# Patient Record
Sex: FEMALE | Race: WHITE | NOT HISPANIC OR LATINO | Employment: OTHER | URBAN - METROPOLITAN AREA
[De-identification: names, ages, dates, MRNs, and addresses within clinical notes are randomized per-mention and may not be internally consistent; named-entity substitution may affect disease eponyms.]

---

## 2017-10-05 ENCOUNTER — TRANSFERRED RECORDS (OUTPATIENT)
Dept: HEALTH INFORMATION MANAGEMENT | Facility: CLINIC | Age: 77
End: 2017-10-05

## 2017-10-13 ENCOUNTER — ONCOLOGY VISIT (OUTPATIENT)
Dept: ONCOLOGY | Facility: CLINIC | Age: 77
End: 2017-10-13
Attending: INTERNAL MEDICINE
Payer: COMMERCIAL

## 2017-10-13 VITALS
HEART RATE: 72 BPM | BODY MASS INDEX: 18.82 KG/M2 | RESPIRATION RATE: 16 BRPM | DIASTOLIC BLOOD PRESSURE: 76 MMHG | OXYGEN SATURATION: 100 % | WEIGHT: 123.8 LBS | SYSTOLIC BLOOD PRESSURE: 130 MMHG

## 2017-10-13 DIAGNOSIS — D05.11 DUCTAL CARCINOMA IN SITU (DCIS) OF RIGHT BREAST: Primary | ICD-10-CM

## 2017-10-13 PROCEDURE — 99214 OFFICE O/P EST MOD 30 MIN: CPT | Performed by: INTERNAL MEDICINE

## 2017-10-13 PROCEDURE — 99211 OFF/OP EST MAY X REQ PHY/QHP: CPT

## 2017-10-13 ASSESSMENT — PAIN SCALES - GENERAL: PAINLEVEL: NO PAIN (0)

## 2017-10-13 NOTE — PROGRESS NOTES
"Oncology Rooming Note    October 13, 2017 12:25 PM   Laverne Christensen is a 77 year old female who presents for:    Chief Complaint   Patient presents with     Oncology Clinic Visit     Ductal carcinoma in situ (DCIS) of right breast     Initial Vitals: /81 (BP Location: Left arm, Patient Position: Sitting, Cuff Size: Adult Regular)  Pulse 72  Resp 16  Wt 56.2 kg (123 lb 12.8 oz)  SpO2 100%  BMI 18.82 kg/m2 Estimated body mass index is 18.82 kg/(m^2) as calculated from the following:    Height as of 3/15/16: 1.727 m (5' 8\").    Weight as of this encounter: 56.2 kg (123 lb 12.8 oz). Body surface area is 1.64 meters squared.  No Pain (0) Comment: Data Unavailable   No LMP recorded. Patient is postmenopausal.  Allergies reviewed: Yes  Medications reviewed: Yes    Medications: Medication refills not needed today.  Pharmacy name entered into [facility]:    [facility] [address] [place], [place] [address], [place] [facility] [address] [facility] [address] [facility] [address]    Clinical concerns: None                4 minutes for nursing intake (face to face time)     Michelle Rangel MA    DISCHARGE PLAN:  Next appointments: See patient instruction section.  Brought the patient to the [facility] for scheduling.  Departure Mode: Ambulatory  Accompanied by: self  4 minutes for nursing discharge (face to face time)   Michelle Rangel MA    "

## 2017-10-13 NOTE — MR AVS SNAPSHOT
"              After Visit Summary   10/13/2017    Laverne Christensen    MRN: [id number]           Patient Information     Date Of Birth          1940        Visit Information        Provider Department      10/13/2017 12:30 PM Heather Vincent MD [facility] [place], operated [facility]        Today's Diagnoses     Ductal carcinoma in situ (DCIS) of right breast    -  1      Care Instructions    -mammogram in 1 year at [facility]  -return to clinic in 1 year          Follow-ups after your visit        Who to contact     If you have questions or need follow up information about today's clinic visit or your schedule please contact [facility] directly at [phone].  Normal or non-critical lab and imaging results will be communicated to you by [employer], letter or phone within 4 business days after the clinic has received the results. If you do not hear from us within 7 days, please contact the clinic through [employer] or phone. If you have a critical or abnormal lab result, we will notify you by phone as soon as possible.  Submit refill requests through [employer] or call your pharmacy and they will forward the refill request to us. Please allow 3 business days for your refill to be completed.          Additional Information About Your Visit        MyChart Information     [employer] lets you send messages to your doctor, view your test results, renew your prescriptions, schedule appointments and more. To sign up, go to [email or web address] . Click on \"Log in\" on the left side of the screen, which will take you to the Welcome page. Then click on \"Sign up Now\" on the right side of the page.     You will be asked to enter the access code listed below, as well as some personal information. Please follow the directions to create your username and password.     Your access code is: [email or web address]  Expires: 2018  1:04 PM     Your access code will  in 90 days. If you need help or a new code, please call " your [place] clinic or [phone].        Care EveryWhere ID     This is your Care EveryWhere ID. This could be used by other organizations to access your [place] medical records  [id number]        Your Vitals Were     Pulse Respirations Pulse Oximetry BMI (Body Mass Index)          72 16 100% 18.82 kg/m2         Blood Pressure from Last 3 Encounters:   10/13/17 130/76   11/15/16 114/65   03/30/16 98/70    Weight from Last 3 Encounters:   10/13/17 56.2 kg (123 lb 12.8 oz)   11/15/16 51.9 kg (114 lb 6.4 oz)   03/16/16 55.8 kg (123 lb)              Today, you had the following     No orders found for display       Primary Care Provider Office Phone # Fax #    Joel Jimenez [phone]       [facility] [address] [place] [address]        Equal Access to Services     [facility]: Hadii aad ku hadasho Soomaali, waaxda luqadaha, qaybta kaalmada adeegyada, waxay idiin haynickn alden nichols . So [facility] [phone].    ATENCIÓN: Si habla español, tiene a renteria disposición servicios gratuitos de asistencia lingüística. Llame al [phone].    We comply with applicable federal civil rights laws and [place] laws. We do not discriminate on the basis of race, color, national origin, age, disability, sex, sexual orientation, or gender identity.            Thank you!     Thank you for choosing [facility]  for your care. Our goal is always to provide you with excellent care. Hearing back from our patients is one way we can continue to improve our services. Please take a few minutes to complete the written survey that you may receive in the mail after your visit with us. Thank you!             Your Updated Medication List - Protect others around you: Learn how to safely use, store and throw away your medicines at www.disposemymeds.org.          This list is accurate as of: 10/13/17  1:04 PM.  Always use your most recent med list.                   Brand Name  Dispense Instructions for use Diagnosis    ASPIRIN PO      Take by mouth daily        calcium carb 1250 mg (500 mg [place])/vitamin D 200 units 500-200 MG-UNIT per tablet    OSCAL with D     Take 1 tablet by mouth 2 times daily (with meals)        COQ10 PO      Take  by mouth daily.        FOSAMAX PO      Take 70 mg by mouth once a week        levothyroxine 75 MCG tablet    SYNTHROID/LEVOTHROID     1 TABLET DAILY        MULTIPLE VITAMIN PO      None Entered        NEW MED      daily Eye vitamin pill        VITAMIN C PO      None Entered        ZINC PO      None Entered

## 2017-10-13 NOTE — PATIENT INSTRUCTIONS
-mammogram in 1 year at [facility] - patient requested appointment scheduled for [place] location - Scheduled - Kandy  -return to clinic in 1 year  - Scheduled Kandy    AVS given to patient - Kandy

## 2017-10-13 NOTE — PROGRESS NOTES
[facility]    Hematology/Oncology Established Patient Follow-up Note      Today's Date: 10/13/2017    Reason for Follow-up: bilateral breast cancer    HISTORY OF PRESENT ILLNESS: Laverne Christensen is a 77 year old female who initially saw Dr. Lima for breast cancer.  She had a left lumpectomy on 03/08/1995 demonstrating a grade 1 lesion with 7 negative lymph nodes. She received Cytoxan, methotrexate and 5-FU and then tamoxifen until 12/2001 when the tamoxifen was discontinued. She also had received radiation therapy to the left breast, completing it on 05/23/1995.   She was found to have a suspicious lesion in the right breast in 2011 on a routine mammogram. Biopsy showed ductal carcinoma in situ. The lesion measured 2 cm and was a grade 1 lesion without evidence of invasive disease. She went on to receive radiation therapy at [facility], receiving 4860 cGy in 33 fractions from 10/03/2011 through 11/16/2011. The tumor was triple negative. She is therefore being followed without hormonal therapy. She has never had recurrence of either breast cancer.      She saw Dr. Lima in 09/12/2013 she had undergone a routine CT scan of the chest showing a bilobed ground-glass nodular opacity in the right lung which was slightly more nodular and more prominent. She has a 30-pack-year smoking history. She was referred to Dr. Rupert Bryant, who referred her to Dr. Ravinder Figueroa. Dr. Figueroa performed a biopsy on the lesion in 10/2014. It was benign, and therefore follow-up chest CT's have been done.      On CT chest in April 2015, there was a new 2 cm pleural-based lung nodule in the  right lower lobe.  She underwent wedge biopsy on 5/12/15, and it was benign.      INTERIM HISTORY:  Jazmin comes in for her regular follow-up for her history of breast cancer.  She says that she has neuropathy in her feet, and she saw neurology.  She thinks it is related to her smoking.  She denies new breast lumps/bumps.   Her  passed away last year.        REVIEW OF SYSTEMS:   14 point ROS was reviewed and is negative other than as noted above in HPI.        HOME MEDICATIONS:  Current Outpatient Prescriptions   Medication Sig Dispense Refill     ASPIRIN PO Take by mouth daily       Alendronate Sodium (FOSAMAX PO) Take 70 mg by mouth once a week       calcium carb 1250 mg, 500 mg [place],/vitamin D 200 units (OSCAL WITH D) 500-200 MG-UNIT per tablet Take 1 tablet by mouth 2 times daily (with meals)       NEW MED daily Eye vitamin pill       Coenzyme Q10 (COQ10 PO) Take  by mouth daily.       LEVOTHYROXINE SODIUM 75 MCG PO TABS 1 TABLET DAILY       MULTIPLE VITAMIN PO None Entered       VITAMIN C PO None Entered       ZINC PO None Entered           ALLERGIES:  No Known Allergies      PAST MEDICAL HISTORY:  Past Medical History:   Diagnosis Date     Arthritis     DJD     COPD (chronic obstructive pulmonary disease) (H)      Osteopenia      Thyroid disease     HYPOTHYROIDISM         PAST SURGICAL HISTORY:  Past Surgical History:   Procedure Laterality Date     ABDOMEN SURGERY      SM INTESTINE SURGERY     APPENDECTOMY       BREAST SURGERY      BREAST LUMPECTOMY     ENT SURGERY      TONSILLECTOMY     HEAD & NECK SURGERY      THYROIDECTOMY     PHACOEMULSIFICATION CLEAR CORNEA WITH STANDARD INTRAOCULAR LENS IMPLANT Left 3/16/2016    Procedure: PHACOEMULSIFICATION CLEAR CORNEA WITH STANDARD INTRAOCULAR LENS IMPLANT;  Surgeon: Darius Finch MD;  Location: [facility]     PHACOEMULSIFICATION CLEAR CORNEA WITH STANDARD INTRAOCULAR LENS IMPLANT Right 3/30/2016    Procedure: PHACOEMULSIFICATION CLEAR CORNEA WITH STANDARD INTRAOCULAR LENS IMPLANT;  Surgeon: Darius Finch MD;  Location: [facility]     THORACIC SURGERY      LUNG NODULE REMOVAL     WEDGE RESECTION EYELID Left 12/4/2015    Procedure: WEDGE RESECTION EYELID;  Surgeon: Jone Acharya MD;  Location: [facility]         SOCIAL HISTORY:  Social History     Social History     Marital status:       Spouse name: N/A     Number of children: N/A     Years of education: N/A     Occupational History     Not on file.     Social History Main Topics     Smoking status: Current Every Day Smoker     Smokeless tobacco: Not on file     Alcohol use Yes      Comment: daily cocktail     Drug use: No     Sexual activity: Not on file     Other Topics Concern     Not on file     Social History Narrative         FAMILY HISTORY:  No family history on file.      PHYSICAL EXAM:  Vital signs:  /76 (BP Location: Left arm, Patient Position: Sitting, Cuff Size: Adult Regular)  Pulse 72  Resp 16  Wt 56.2 kg (123 lb 12.8 oz)  SpO2 100%  BMI 18.82 kg/m2   ECO  GENERAL/CONSTITUTIONAL: No acute distress.  EYES: No scleral icterus.  LYMPH: No anterior cervical, posterior cervical, supraclavicular, or axillary adenopathy.   RESPIRATORY:  Clear to ausculation bilaterally.  CARDIOVASCULAR: Regular rate and rhythm without murmurs, gallops, or rubs.  GASTROINTESTINAL: No tenderness. The patient has normal bowel sounds. No guarding.  No distention.  BREAST: Right-no palpable mass, discharge, rash, or axillary lymphadenopathy.  Left-no palpable mass, discharge, rash, or axillary lymphadenopathy.    MUSCULOSKELETAL: Warm and well-perfused, no cyanosis, clubbing, or edema.  NEUROLOGIC: Alert, oriented, answers questions appropriately.  INTEGUMENTARY: No jaundice.  Scattered moles.      LABS:  None today.      IMAGING:  Bilateral mammogram 10/05/17:  BI-RADS 2: benign     CT chest 10/9/17:  1.8 mm spiculated right apical nodule series 3 image 19 is subtly more prominent compared to prior exam; however, when viewed on coronal images, it appears stable favoring this perceived increase is due to difference in technique and slice selection. Recommend 6 month follow-up or as otherwise recommended by pulmonary service.    2. Decreased size of a lobulated left basilar opacity suggesting evolving scarring and decreased loculated  effusion.    3. Otherwise stable CT of the chest.      ASSESSMENT/PLAN:  Laverne Christensen is a 77 year old female:     1) Left sided breast cancer in 1995, and right sided DCIS in 2011: Now on surveillance.  Most recent bilateral mammogram done in 10/05/17 was reviewed, and is benign.  -continue yearly mammograms, next one due in October 2018  -RTC in 1 year    2) Pulmonary nodules: she does have a heavy smoking history.  There was a 2 cm pleural-based lung nodule in the right lower lobe that was biopsied and benign.  Other pulmonary nodules also being followed.  -patient follows with Dr. Figueroa  -she had a CT chest done in October 2017 at [facility], which was stable.  She has follow-up with Dr. Figueroa next week.    3) Smoking cessation: She continues to smoke half a pack a day.  She is trying to cut down and has tried medications and patches, but has been unsuccessful.  She tried nicotine patch, but got nightmares from it.  She is considering Chantix.  -continue to advise smoking cessation    She declined flu shot today.      I spent a total of 25 minutes with the patient, with over >50% of the time in counseling and/or coordination of care.       Heather Vincent MD  Hematology/Oncology  [facility]

## 2017-10-13 NOTE — LETTER
"    10/13/2017        RE: Laverne Christensen  [address]  [place] [address]        Oncology Rooming Note    October 13, 2017 12:25 PM   Laverne Christensen is a 77 year old female who presents for:    Chief Complaint   Patient presents with     Oncology Clinic Visit     Ductal carcinoma in situ (DCIS) of right breast     Initial Vitals: /81 (BP Location: Left arm, Patient Position: Sitting, Cuff Size: Adult Regular)  Pulse 72  Resp 16  Wt 56.2 kg (123 lb 12.8 oz)  SpO2 100%  BMI 18.82 kg/m2 Estimated body mass index is 18.82 kg/(m^2) as calculated from the following:    Height as of 3/15/16: 1.727 m (5' 8\").    Weight as of this encounter: 56.2 kg (123 lb 12.8 oz). Body surface area is 1.64 meters squared.  No Pain (0) Comment: Data Unavailable   No LMP recorded. Patient is postmenopausal.  Allergies reviewed: Yes  Medications reviewed: Yes    Medications: Medication refills not needed today.  Pharmacy name entered into [email or web address]:    [id number] - [place], [place] - [address] AT [facility] [place] [address] [facility]ARMACY [facility] [place] - [address]    Clinical concerns: None                4 minutes for nursing intake (face to face time)     Michelle Rangel MA              [facility]    Hematology/Oncology Established Patient Follow-up Note      Today's Date: 10/13/2017    Reason for Follow-up: bilateral breast cancer    HISTORY OF PRESENT ILLNESS: Laverne Christensen is a 77 year old female who initially saw Dr. Lima for breast cancer.  She had a left lumpectomy on 03/08/1995 demonstrating a grade 1 lesion with 7 negative lymph nodes. She received Cytoxan, methotrexate and 5-FU and then tamoxifen until 12/2001 when the tamoxifen was discontinued. She also had received radiation therapy to the left breast, completing it on 05/23/1995.   She was found to have a suspicious lesion in the right breast in 2011 on a routine mammogram. Biopsy " showed ductal carcinoma in situ. The lesion measured 2 cm and was a grade 1 lesion without evidence of invasive disease. She went on to receive radiation therapy at [facility], receiving 4860 cGy in 33 fractions from 10/03/2011 through 11/16/2011. The tumor was triple negative. She is therefore being followed without hormonal therapy. She has never had recurrence of either breast cancer.      She saw Dr. Lima in 09/12/2013 she had undergone a routine CT scan of the chest showing a bilobed ground-glass nodular opacity in the right lung which was slightly more nodular and more prominent. She has a 30-pack-year smoking history. She was referred to Dr. Rupert Bryant, who referred her to Dr. Ravinder Figueroa. Dr. Figueroa performed a biopsy on the lesion in 10/2014. It was benign, and therefore follow-up chest CT's have been done.      On CT chest in April 2015, there was a new 2 cm pleural-based lung nodule in the  right lower lobe.  She underwent wedge biopsy on 5/12/15, and it was benign.      INTERIM HISTORY:  Jazmin comes in for her regular follow-up for her history of breast cancer.  She says that she has neuropathy in her feet, and she saw neurology.  She thinks it is related to her smoking.  She denies new breast lumps/bumps.  Her  passed away last year.        REVIEW OF SYSTEMS:   14 point ROS was reviewed and is negative other than as noted above in HPI.        HOME MEDICATIONS:  Current Outpatient Prescriptions   Medication Sig Dispense Refill     ASPIRIN PO Take by mouth daily       Alendronate Sodium (FOSAMAX PO) Take 70 mg by mouth once a week       calcium carb 1250 mg, 500 mg [place],/vitamin D 200 units (OSCAL WITH D) 500-200 MG-UNIT per tablet Take 1 tablet by mouth 2 times daily (with meals)       NEW MED daily Eye vitamin pill       Coenzyme Q10 (COQ10 PO) Take  by mouth daily.       LEVOTHYROXINE SODIUM 75 MCG PO TABS 1 TABLET DAILY       MULTIPLE VITAMIN PO None Entered       VITAMIN C PO None  Entered       ZINC PO None Entered           ALLERGIES:  No Known Allergies      PAST MEDICAL HISTORY:  Past Medical History:   Diagnosis Date     Arthritis     DJD     COPD (chronic obstructive pulmonary disease) (H)      Osteopenia      Thyroid disease     HYPOTHYROIDISM         PAST SURGICAL HISTORY:  Past Surgical History:   Procedure Laterality Date     ABDOMEN SURGERY      SM INTESTINE SURGERY     APPENDECTOMY       BREAST SURGERY      BREAST LUMPECTOMY     ENT SURGERY      TONSILLECTOMY     HEAD & NECK SURGERY      THYROIDECTOMY     PHACOEMULSIFICATION CLEAR CORNEA WITH STANDARD INTRAOCULAR LENS IMPLANT Left 3/16/2016    Procedure: PHACOEMULSIFICATION CLEAR CORNEA WITH STANDARD INTRAOCULAR LENS IMPLANT;  Surgeon: Darius Finch MD;  Location: [facility]     PHACOEMULSIFICATION CLEAR CORNEA WITH STANDARD INTRAOCULAR LENS IMPLANT Right 3/30/2016    Procedure: PHACOEMULSIFICATION CLEAR CORNEA WITH STANDARD INTRAOCULAR LENS IMPLANT;  Surgeon: Darius Finch MD;  Location: [facility]     THORACIC SURGERY      LUNG NODULE REMOVAL     WEDGE RESECTION EYELID Left 2015    Procedure: WEDGE RESECTION EYELID;  Surgeon: Jone Acharya MD;  Location: [facility]         SOCIAL HISTORY:  Social History     Social History     Marital status:      Spouse name: N/A     Number of children: N/A     Years of education: N/A     Occupational History     Not on file.     Social History Main Topics     Smoking status: Current Every Day Smoker     Smokeless tobacco: Not on file     Alcohol use Yes      Comment: daily cocktail     Drug use: No     Sexual activity: Not on file     Other Topics Concern     Not on file     Social History Narrative         FAMILY HISTORY:  No family history on file.      PHYSICAL EXAM:  Vital signs:  /76 (BP Location: Left arm, Patient Position: Sitting, Cuff Size: Adult Regular)  Pulse 72  Resp 16  Wt 56.2 kg (123 lb 12.8 oz)  SpO2 100%  BMI 18.82 kg/m2   ECO  GENERAL/CONSTITUTIONAL:  No acute distress.  EYES: No scleral icterus.  LYMPH: No anterior cervical, posterior cervical, supraclavicular, or axillary adenopathy.   RESPIRATORY:  Clear to ausculation bilaterally.  CARDIOVASCULAR: Regular rate and rhythm without murmurs, gallops, or rubs.  GASTROINTESTINAL: No tenderness. The patient has normal bowel sounds. No guarding.  No distention.  BREAST: Right-no palpable mass, discharge, rash, or axillary lymphadenopathy.  Left-no palpable mass, discharge, rash, or axillary lymphadenopathy.    MUSCULOSKELETAL: Warm and well-perfused, no cyanosis, clubbing, or edema.  NEUROLOGIC: Alert, oriented, answers questions appropriately.  INTEGUMENTARY: No jaundice.  Scattered moles.      LABS:  None today.      IMAGING:  Bilateral mammogram 10/05/17:  BI-RADS 2: benign     CT chest 10/9/17:  1.8 mm spiculated right apical nodule series 3 image 19 is subtly more prominent compared to prior exam; however, when viewed on coronal images, it appears stable favoring this perceived increase is due to difference in technique and slice selection. Recommend 6 month follow-up or as otherwise recommended by pulmonary service.    2. Decreased size of a lobulated left basilar opacity suggesting evolving scarring and decreased loculated effusion.    3. Otherwise stable CT of the chest.      ASSESSMENT/PLAN:  Laverne Christensen is a 77 year old female:     1) Left sided breast cancer in 1995, and right sided DCIS in 2011: Now on surveillance.  Most recent bilateral mammogram done in 10/05/17 was reviewed, and is benign.  -continue yearly mammograms, next one due in October 2018  -RTC in 1 year    2) Pulmonary nodules: she does have a heavy smoking history.  There was a 2 cm pleural-based lung nodule in the right lower lobe that was biopsied and benign.  Other pulmonary nodules also being followed.  -patient follows with Dr. Figueroa  -she had a CT chest done in October 2017 at [facility], which was stable.  She has follow-up  with Dr. Figueroa next week.    3) Smoking cessation: She continues to smoke half a pack a day.  She is trying to cut down and has tried medications and patches, but has been unsuccessful.  She tried nicotine patch, but got nightmares from it.  She is considering Chantix.  -continue to advise smoking cessation      I spent a total of 25 minutes with the patient, with over >50% of the time in counseling and/or coordination of care.       Heather Vincent MD  Hematology/Oncology  [facility]            Sincerely,        Heather Vincent MD

## 2018-10-08 ENCOUNTER — TRANSFERRED RECORDS (OUTPATIENT)
Dept: HEALTH INFORMATION MANAGEMENT | Facility: CLINIC | Age: 78
End: 2018-10-08

## 2018-10-12 ENCOUNTER — ONCOLOGY VISIT (OUTPATIENT)
Dept: ONCOLOGY | Facility: CLINIC | Age: 78
End: 2018-10-12
Attending: INTERNAL MEDICINE
Payer: COMMERCIAL

## 2018-10-12 VITALS
SYSTOLIC BLOOD PRESSURE: 135 MMHG | TEMPERATURE: 98.3 F | BODY MASS INDEX: 18.76 KG/M2 | WEIGHT: 123.4 LBS | OXYGEN SATURATION: 97 % | RESPIRATION RATE: 16 BRPM | DIASTOLIC BLOOD PRESSURE: 77 MMHG | HEART RATE: 72 BPM

## 2018-10-12 DIAGNOSIS — Z12.31 VISIT FOR SCREENING MAMMOGRAM: ICD-10-CM

## 2018-10-12 DIAGNOSIS — D05.11 DUCTAL CARCINOMA IN SITU (DCIS) OF RIGHT BREAST: Primary | ICD-10-CM

## 2018-10-12 PROCEDURE — 99214 OFFICE O/P EST MOD 30 MIN: CPT | Performed by: INTERNAL MEDICINE

## 2018-10-12 PROCEDURE — G0463 HOSPITAL OUTPT CLINIC VISIT: HCPCS

## 2018-10-12 RX ORDER — OXYBUTYNIN CHLORIDE 5 MG/1
5 TABLET ORAL
COMMUNITY
Start: 2018-01-18 | End: 2019-01-18

## 2018-10-12 ASSESSMENT — PAIN SCALES - GENERAL: PAINLEVEL: MILD PAIN (2)

## 2018-10-12 NOTE — PATIENT INSTRUCTIONS
-schedule mammogram in October 2019 - she get them done with [facility] Patient will scheduled her mammogram/ Karol   -return to clinic in 1 year  Scheduled 10/11/19  1:00pm/ Karol      Patient declined AVS, made note of appointment/ Karol

## 2018-10-12 NOTE — MR AVS SNAPSHOT
After Visit Summary   10/12/2018    Laverne Christensen    MRN: [id number]           Patient Information     Date Of Birth          1940        Visit Information        Provider Department      10/12/2018 1:30 PM Heather Vincent MD [facility]        Today's Diagnoses     Ductal carcinoma in situ (DCIS) of right breast    -  1    Visit for screening mammogram          Care Instructions    -schedule mammogram in October 2019 - she get them done with [facility] Patient will scheduled her mammogram/ Karol   -return to clinic in 1 year  Scheduled 10/11/19  1:00pm/ Karol      Patient declined AVS, made note of appointment/ Karol           Follow-ups after your visit        Your next 10 appointments already scheduled     Oct 11, 2019  1:00 PM CDT   Return Visit with Heather Vincent MD   [facility] ([facility])    [facility] [place]  [address]  [place] [address]   [phone]              Future tests that were ordered for you today     Open Future Orders        Priority Expected Expires Ordered    MA Screen Bilateral w/Severo Routine 10/9/2019 10/12/2019 10/12/2018            Who to contact     If you have questions or need follow up information about today's clinic visit or your schedule please contact [facility] directly at [phone].  Normal or non-critical lab and imaging results will be communicated to you by MyChart, letter or phone within 4 business days after the clinic has received the results. If you do not hear from us within 7 days, please contact the clinic through MyChart or phone. If you have a critical or abnormal lab result, we will notify you by phone as soon as possible.  Submit refill requests through [employer] or call your pharmacy and they will forward the refill request to us. Please allow 3 business days for your refill to be completed.          Additional Information About  Your Visit        Care EveryWhere ID     This is your Care EveryWhere ID. This could be used by other organizations to access your [facility] medical records  [id number]        Your Vitals Were     Pulse Temperature Respirations Pulse Oximetry BMI (Body Mass Index)       72 98.3  F (36.8  C) (Oral) 16 97% 18.76 kg/m2        Blood Pressure from Last 3 Encounters:   10/12/18 135/77   10/13/17 130/76   11/15/16 114/65    Weight from Last 3 Encounters:   10/12/18 56 kg (123 lb 6.4 oz)   10/13/17 56.2 kg (123 lb 12.8 oz)   11/15/16 51.9 kg (114 lb 6.4 oz)               Primary Care Provider Office Phone # Fax #    Joel Jimenez [phone]       [facility] [address] [place] [address]        Equal Access to Services     [facility] : Hadii aad ku hadasho Soomaali, waaxda luqadaha, qaybta kaalmada adeegyada, joel nichols . So [facility] [place] [facility] [phone].    ATENCIÓN: Si lylela español, tiene a renteria disposición servicios gratuitos de asistencia lingüística. Asifame al [phone].    We comply with applicable federal civil rights laws and [place] laws. We do not discriminate on the basis of race, color, national origin, age, disability, sex, sexual orientation, or gender identity.            Thank you!     Thank you for choosing [facility]  for your care. Our goal is always to provide you with excellent care. Hearing back from our patients is one way we can continue to improve our services. Please take a few minutes to complete the written survey that you may receive in the mail after your visit with us. Thank you!             Your Updated Medication List - Protect others around you: Learn how to safely use, store and throw away your medicines at www.disposemymeds.org.          This list is accurate as of 10/12/18  5:24 PM.  Always use your most recent med list.                   Brand Name Dispense Instructions for use Diagnosis    ASPIRIN PO       Take by mouth daily        calcium carbonate 500 mg-vitamin D 200 units 500-200 MG-UNIT per tablet    OSCAL with D;OYSTER SHELL CALCIUM     Take 1 tablet by mouth 2 times daily (with meals)        COQ10 PO      Take  by mouth daily.        FOSAMAX PO      Take 70 mg by mouth once a week        levothyroxine 75 MCG tablet    SYNTHROID/LEVOTHROID     1 TABLET DAILY        MULTIPLE VITAMIN PO      None Entered        NEW MED      daily Eye vitamin pill        oxybutynin 5 MG tablet    DITROPAN     Take 5 mg by mouth        VITAMIN C PO      None Entered        ZINC PO      None Entered

## 2018-10-12 NOTE — PROGRESS NOTES
[facility]    Hematology/Oncology Established Patient Follow-up Note      Today's Date: 10/12/2018    Reason for Follow-up: bilateral breast cancer    HISTORY OF PRESENT ILLNESS: Laverne Christensen is a 78 year old female who initially saw Dr. Lima for breast cancer.  She had a left lumpectomy on 03/08/1995 demonstrating a grade 1 lesion with 7 negative lymph nodes. She received Cytoxan, methotrexate and 5-FU and then tamoxifen until 12/2001 when the tamoxifen was discontinued. She also had received radiation therapy to the left breast, completing it on 05/23/1995.   She was found to have a suspicious lesion in the right breast in 2011 on a routine mammogram. Biopsy showed ductal carcinoma in situ. The lesion measured 2 cm and was a grade 1 lesion without evidence of invasive disease. She went on to receive radiation therapy at [facility], receiving 4860 cGy in 33 fractions from 10/03/2011 through 11/16/2011. The tumor was triple negative. She is therefore being followed without hormonal therapy. She has never had recurrence of either breast cancer.      She saw Dr. Lima in 09/12/2013 she had undergone a routine CT scan of the chest showing a bilobed ground-glass nodular opacity in the right lung which was slightly more nodular and more prominent. She has a 30-pack-year smoking history. She was referred to Dr. Rupert Bryant, who referred her to Dr. Ravinder Figueroa. Dr. Figueroa performed a biopsy on the lesion in 10/2014. It was benign, and therefore follow-up chest CT's have been done.      On CT chest in April 2015, there was a new 2 cm pleural-based lung nodule in the right lower lobe.  She underwent wedge biopsy on 5/12/15, and it was benign.      INTERIM HISTORY:  Jazmin comes in for her regular follow-up for her history of breast cancer.  She says that she is feeling fine.  She denies new symptoms.  She denies new breast lumps/bumps or new pains.  She still has no motivation to quit  smoking.        REVIEW OF SYSTEMS:   14 point ROS was reviewed and is negative other than as noted above in HPI.        HOME MEDICATIONS:  Current Outpatient Prescriptions   Medication Sig Dispense Refill     Alendronate Sodium (FOSAMAX PO) Take 70 mg by mouth once a week       calcium carb 1250 mg, 500 mg [place],/vitamin D 200 units (OSCAL WITH D) 500-200 MG-UNIT per tablet Take 1 tablet by mouth 2 times daily (with meals)       Coenzyme Q10 (COQ10 PO) Take  by mouth daily.       LEVOTHYROXINE SODIUM 75 MCG PO TABS 1 TABLET DAILY       MULTIPLE VITAMIN PO None Entered       NEW MED daily Eye vitamin pill       oxybutynin (DITROPAN) 5 MG tablet Take 5 mg by mouth       VITAMIN C PO None Entered       ZINC PO None Entered       ASPIRIN PO Take by mouth daily           ALLERGIES:  No Known Allergies      PAST MEDICAL HISTORY:  Past Medical History:   Diagnosis Date     Arthritis     DJD     COPD (chronic obstructive pulmonary disease) (H)      Osteopenia      Thyroid disease     HYPOTHYROIDISM         PAST SURGICAL HISTORY:  Past Surgical History:   Procedure Laterality Date     ABDOMEN SURGERY      SM INTESTINE SURGERY     APPENDECTOMY       BREAST SURGERY      BREAST LUMPECTOMY     ENT SURGERY      TONSILLECTOMY     HEAD & NECK SURGERY      THYROIDECTOMY     PHACOEMULSIFICATION CLEAR CORNEA WITH STANDARD INTRAOCULAR LENS IMPLANT Left 3/16/2016    Procedure: PHACOEMULSIFICATION CLEAR CORNEA WITH STANDARD INTRAOCULAR LENS IMPLANT;  Surgeon: Darius Finch MD;  Location: [facility]     PHACOEMULSIFICATION CLEAR CORNEA WITH STANDARD INTRAOCULAR LENS IMPLANT Right 3/30/2016    Procedure: PHACOEMULSIFICATION CLEAR CORNEA WITH STANDARD INTRAOCULAR LENS IMPLANT;  Surgeon: Darius Finch MD;  Location: [facility]     THORACIC SURGERY      LUNG NODULE REMOVAL     WEDGE RESECTION EYELID Left 12/4/2015    Procedure: WEDGE RESECTION EYELID;  Surgeon: Jone Acharya MD;  Location: [facility]         SOCIAL HISTORY:  Social History      Social History     Marital status:      Spouse name: N/A     Number of children: N/A     Years of education: N/A     Occupational History     Not on file.     Social History Main Topics     Smoking status: Current Every Day Smoker     Smokeless tobacco: Not on file     Alcohol use Yes      Comment: daily cocktail     Drug use: No     Sexual activity: Not on file     Other Topics Concern     Not on file     Social History Narrative         FAMILY HISTORY:  No family history on file.      PHYSICAL EXAM:  Vital signs:  /77 (BP Location: Left arm, Patient Position: Chair, Cuff Size: Adult Regular)  Pulse 72  Temp 98.3  F (36.8  C) (Oral)  Resp 16  Wt 56 kg (123 lb 6.4 oz)  SpO2 97%  BMI 18.76 kg/m2   ECO  GENERAL/CONSTITUTIONAL: No acute distress.  EYES: No scleral icterus.  LYMPH: No anterior cervical, posterior cervical, supraclavicular, or axillary adenopathy.   RESPIRATORY:  Clear to ausculation bilaterally.  CARDIOVASCULAR: Regular rate and rhythm without murmurs, gallops, or rubs.  GASTROINTESTINAL: No tenderness. The patient has normal bowel sounds. No guarding.  No distention.  BREAST: Right-no palpable mass, discharge, rash, or axillary lymphadenopathy.  Left-no palpable mass, discharge, rash, or axillary lymphadenopathy.    MUSCULOSKELETAL: Warm and well-perfused, no cyanosis, clubbing, or edema.  NEUROLOGIC: Alert, oriented, answers questions appropriately.  INTEGUMENTARY: No jaundice.  Scattered moles.      LABS:  None today.      IMAGING:  Bilateral mammogram 10/08/18:  BI-RADS 2: benign     CT chest 10/9/17:  1.8 mm spiculated right apical nodule series 3 image 19 is subtly more prominent compared to prior exam; however, when viewed on coronal images, it appears stable favoring this perceived increase is due to difference in technique and slice selection. Recommend 6 month follow-up or as otherwise recommended by pulmonary service.    2. Decreased size of a lobulated left  basilar opacity suggesting evolving scarring and decreased loculated effusion.    3. Otherwise stable CT of the chest.      ASSESSMENT/PLAN:  Laverne Christensen is a 78 year old female:     1) Left sided breast cancer in 1995, and right sided DCIS in 2011: Now on surveillance.  Most recent bilateral mammogram done in 10/08/18 was reviewed, and is benign.  -continue yearly mammograms, next one due in October 2019 - ordered.  She has them done at [facility].  -RTC in 1 year    2) Pulmonary nodules: she does have a heavy smoking history.  There was a 2 cm pleural-based lung nodule in the right lower lobe that was biopsied and benign.  Other pulmonary nodules also being followed.  -patient follows with Dr. Figueroa  -she has follow-up appointment with Dr. Figueroa and follow-up CT scan coming up next month.  She doesn't seem to remember this, and I asked her to call Dr. Figueroa's office to confirm.      3) Smoking cessation: She continues to smoke half a pack a day.  She is trying to cut down and has tried medications and patches, but has been unsuccessful.  She says that she still has no motivation to quit.    4) Basal cell carcinoma: on her face.  She says that she is undergoing Mohs surgery by dermatology soon.        I spent a total of 25 minutes with the patient, with over >50% of the time in counseling and/or coordination of care.       Heather Vincent MD  Hematology/Oncology  [facility]

## 2018-10-12 NOTE — LETTER
"    10/12/2018         RE: Laverne Christensen  [address]  [place] [address]        Dear Colleague,    Thank you for referring your patient, Laverne Christensen, to the [facility]. Please see a copy of my visit note below.    Oncology Rooming Note    October 12, 2018 1:22 PM   Laverne Christensen is a 78 year old female who presents for:    Chief Complaint   Patient presents with     Oncology Clinic Visit     Initial Vitals: /77 (BP Location: Left arm, Patient Position: Chair, Cuff Size: Adult Regular)  Pulse 72  Temp 98.3  F (36.8  C) (Oral)  Resp 16  Wt 56 kg (123 lb 6.4 oz)  SpO2 97%  BMI 18.76 kg/m2 Estimated body mass index is 18.76 kg/(m^2) as calculated from the following:    Height as of 3/15/16: 1.727 m (5' 8\").    Weight as of this encounter: 56 kg (123 lb 6.4 oz). Body surface area is 1.64 meters squared.  Mild Pain (2) Comment: knee pain   No LMP recorded. Patient is postmenopausal.  Allergies reviewed: Yes  Medications reviewed: Yes    Medications: Medication refills not needed today.  Pharmacy name entered into [employer]:    [id number] - [place], [place] - [address] AT [facility] [place] & [address] PHARMACY [facility] [place], [place] - [address]    Clinical concerns: None     5 minutes for nursing intake (face to face time)     Zoe Hernandez [facility]    Hematology/Oncology Established Patient Follow-up Note      Today's Date: 10/12/2018    Reason for Follow-up: bilateral breast cancer    HISTORY OF PRESENT ILLNESS: Laverne Christensen is a 78 year old female who initially saw Dr. Lima for breast cancer.  She had a left lumpectomy on 03/08/1995 demonstrating a grade 1 lesion with 7 negative lymph nodes. She received Cytoxan, methotrexate and 5-FU and then tamoxifen until 12/2001 when the tamoxifen was discontinued. She also had received radiation therapy to the left breast, completing it on " 05/23/1995.   She was found to have a suspicious lesion in the right breast in 2011 on a routine mammogram. Biopsy showed ductal carcinoma in situ. The lesion measured 2 cm and was a grade 1 lesion without evidence of invasive disease. She went on to receive radiation therapy at [facility], receiving 4860 cGy in 33 fractions from 10/03/2011 through 11/16/2011. The tumor was triple negative. She is therefore being followed without hormonal therapy. She has never had recurrence of either breast cancer.      She saw Dr. Lima in 09/12/2013 she had undergone a routine CT scan of the chest showing a bilobed ground-glass nodular opacity in the right lung which was slightly more nodular and more prominent. She has a 30-pack-year smoking history. She was referred to Dr. Rupert Bryant, who referred her to Dr. Ravinder Figueroa. Dr. Figueroa performed a biopsy on the lesion in 10/2014. It was benign, and therefore follow-up chest CT's have been done.      On CT chest in April 2015, there was a new 2 cm pleural-based lung nodule in the right lower lobe.  She underwent wedge biopsy on 5/12/15, and it was benign.      INTERIM HISTORY:  Jazmin comes in for her regular follow-up for her history of breast cancer.  She says that she is feeling fine.  She denies new symptoms.  She denies new breast lumps/bumps or new pains.  She still has no motivation to quit smoking.        REVIEW OF SYSTEMS:   14 point ROS was reviewed and is negative other than as noted above in HPI.        HOME MEDICATIONS:  Current Outpatient Prescriptions   Medication Sig Dispense Refill     Alendronate Sodium (FOSAMAX PO) Take 70 mg by mouth once a week       calcium carb 1250 mg, 500 mg [place],/vitamin D 200 units (OSCAL WITH D) 500-200 MG-UNIT per tablet Take 1 tablet by mouth 2 times daily (with meals)       Coenzyme Q10 (COQ10 PO) Take  by mouth daily.       LEVOTHYROXINE SODIUM 75 MCG PO TABS 1 TABLET DAILY       MULTIPLE VITAMIN PO None Entered       NEW  MED daily Eye vitamin pill       oxybutynin (DITROPAN) 5 MG tablet Take 5 mg by mouth       VITAMIN C PO None Entered       ZINC PO None Entered       ASPIRIN PO Take by mouth daily           ALLERGIES:  No Known Allergies      PAST MEDICAL HISTORY:  Past Medical History:   Diagnosis Date     Arthritis     DJD     COPD (chronic obstructive pulmonary disease) (H)      Osteopenia      Thyroid disease     HYPOTHYROIDISM         PAST SURGICAL HISTORY:  Past Surgical History:   Procedure Laterality Date     ABDOMEN SURGERY      SM INTESTINE SURGERY     APPENDECTOMY       BREAST SURGERY      BREAST LUMPECTOMY     ENT SURGERY      TONSILLECTOMY     HEAD & NECK SURGERY      THYROIDECTOMY     PHACOEMULSIFICATION CLEAR CORNEA WITH STANDARD INTRAOCULAR LENS IMPLANT Left 3/16/2016    Procedure: PHACOEMULSIFICATION CLEAR CORNEA WITH STANDARD INTRAOCULAR LENS IMPLANT;  Surgeon: Darius Finch MD;  Location: [facility]     PHACOEMULSIFICATION CLEAR CORNEA WITH STANDARD INTRAOCULAR LENS IMPLANT Right 3/30/2016    Procedure: PHACOEMULSIFICATION CLEAR CORNEA WITH STANDARD INTRAOCULAR LENS IMPLANT;  Surgeon: Darius Finch MD;  Location: [facility]     THORACIC SURGERY      LUNG NODULE REMOVAL     WEDGE RESECTION EYELID Left 12/4/2015    Procedure: WEDGE RESECTION EYELID;  Surgeon: Jone Acharya MD;  Location: [facility]         SOCIAL HISTORY:  Social History     Social History     Marital status:      Spouse name: N/A     Number of children: N/A     Years of education: N/A     Occupational History     Not on file.     Social History Main Topics     Smoking status: Current Every Day Smoker     Smokeless tobacco: Not on file     Alcohol use Yes      Comment: daily cocktail     Drug use: No     Sexual activity: Not on file     Other Topics Concern     Not on file     Social History Narrative         FAMILY HISTORY:  No family history on file.      PHYSICAL EXAM:  Vital signs:  /77 (BP Location: Left arm, Patient Position:  Chair, Cuff Size: Adult Regular)  Pulse 72  Temp 98.3  F (36.8  C) (Oral)  Resp 16  Wt 56 kg (123 lb 6.4 oz)  SpO2 97%  BMI 18.76 kg/m2   ECO  GENERAL/CONSTITUTIONAL: No acute distress.  EYES: No scleral icterus.  LYMPH: No anterior cervical, posterior cervical, supraclavicular, or axillary adenopathy.   RESPIRATORY:  Clear to ausculation bilaterally.  CARDIOVASCULAR: Regular rate and rhythm without murmurs, gallops, or rubs.  GASTROINTESTINAL: No tenderness. The patient has normal bowel sounds. No guarding.  No distention.  BREAST: Right-no palpable mass, discharge, rash, or axillary lymphadenopathy.  Left-no palpable mass, discharge, rash, or axillary lymphadenopathy.    MUSCULOSKELETAL: Warm and well-perfused, no cyanosis, clubbing, or edema.  NEUROLOGIC: Alert, oriented, answers questions appropriately.  INTEGUMENTARY: No jaundice.  Scattered moles.      LABS:  None today.      IMAGING:  Bilateral mammogram 10/08/18:  BI-RADS 2: benign     CT chest 10/9/17:  1.8 mm spiculated right apical nodule series 3 image 19 is subtly more prominent compared to prior exam; however, when viewed on coronal images, it appears stable favoring this perceived increase is due to difference in technique and slice selection. Recommend 6 month follow-up or as otherwise recommended by pulmonary service.    2. Decreased size of a lobulated left basilar opacity suggesting evolving scarring and decreased loculated effusion.    3. Otherwise stable CT of the chest.      ASSESSMENT/PLAN:  Laverne Christensen is a 78 year old female:     1) Left sided breast cancer in , and right sided DCIS in : Now on surveillance.  Most recent bilateral mammogram done in 10/08/18 was reviewed, and is benign.  -continue yearly mammograms, next one due in 2019 - ordered.  She has them done at [facility].  -RTC in 1 year    2) Pulmonary nodules: she does have a heavy smoking history.  There was a 2 cm pleural-based lung nodule in  the right lower lobe that was biopsied and benign.  Other pulmonary nodules also being followed.  -patient follows with Dr. Figueroa  -she has follow-up appointment with Dr. Figueroa and follow-up CT scan coming up next month.  She doesn't seem to remember this, and I asked her to call Dr. Figueroa's office to confirm.      3) Smoking cessation: She continues to smoke half a pack a day.  She is trying to cut down and has tried medications and patches, but has been unsuccessful.  She says that she still has no motivation to quit.    4) Basal cell carcinoma: on her face.  She says that she is undergoing Mohs surgery by dermatology soon.        I spent a total of 25 minutes with the patient, with over >50% of the time in counseling and/or coordination of care.       Heather Vincent MD  Hematology/Oncology  [facility]          Again, thank you for allowing me to participate in the care of your patient.        Sincerely,        Heather Vincent MD

## 2018-10-12 NOTE — PROGRESS NOTES
"Oncology Rooming Note    October 12, 2018 1:22 PM   Laverne Christensen is a 78 year old female who presents for:    Chief Complaint   Patient presents with     Oncology Clinic Visit     Initial Vitals: /77 (BP Location: Left arm, Patient Position: Chair, Cuff Size: Adult Regular)  Pulse 72  Temp 98.3  F (36.8  C) (Oral)  Resp 16  Wt 56 kg (123 lb 6.4 oz)  SpO2 97%  BMI 18.76 kg/m2 Estimated body mass index is 18.76 kg/(m^2) as calculated from the following:    Height as of 3/15/16: 1.727 m (5' 8\").    Weight as of this encounter: 56 kg (123 lb 6.4 oz). Body surface area is 1.64 meters squared.  Mild Pain (2) Comment: knee pain   No LMP recorded. Patient is postmenopausal.  Allergies reviewed: Yes  Medications reviewed: Yes    Medications: Medication refills not needed today.  Pharm[facility] entered into [facility]:    [facility] - [place], [place] - [address] AT [facility] [place] [address] PHARMACY [facility] [place] - [address]    Clinical concerns: None     5 minutes for nursing intake (face to face time)     Zoe Hernandez CMA              "

## 2019-10-09 ENCOUNTER — TRANSFERRED RECORDS (OUTPATIENT)
Dept: HEALTH INFORMATION MANAGEMENT | Facility: CLINIC | Age: 79
End: 2019-10-09

## 2019-10-15 ENCOUNTER — ONCOLOGY VISIT (OUTPATIENT)
Dept: ONCOLOGY | Facility: CLINIC | Age: 79
End: 2019-10-15
Attending: INTERNAL MEDICINE
Payer: MEDICARE

## 2019-10-15 VITALS
RESPIRATION RATE: 16 BRPM | HEIGHT: 68 IN | SYSTOLIC BLOOD PRESSURE: 142 MMHG | TEMPERATURE: 97.6 F | WEIGHT: 132.2 LBS | OXYGEN SATURATION: 97 % | DIASTOLIC BLOOD PRESSURE: 72 MMHG | HEART RATE: 68 BPM | BODY MASS INDEX: 20.03 KG/M2

## 2019-10-15 DIAGNOSIS — D05.11 DUCTAL CARCINOMA IN SITU (DCIS) OF RIGHT BREAST: Primary | ICD-10-CM

## 2019-10-15 PROCEDURE — 99214 OFFICE O/P EST MOD 30 MIN: CPT | Performed by: INTERNAL MEDICINE

## 2019-10-15 PROCEDURE — G0463 HOSPITAL OUTPT CLINIC VISIT: HCPCS

## 2019-10-15 ASSESSMENT — PAIN SCALES - GENERAL: PAINLEVEL: MILD PAIN (2)

## 2019-10-15 ASSESSMENT — MIFFLIN-ST. JEOR: SCORE: 1113.16

## 2019-10-15 NOTE — LETTER
"    10/15/2019         RE: Laverne Christensen  [address] [place] [address]        Dear Colleague,    Thank you for referring your patient, Laverne Christensen, to the [facility]. Please see a copy of my visit note below.    Oncology Rooming Note    October 15, 2019 2:12 PM   Laverne Christensen is a 79 year old female who presents for:    Chief Complaint   Patient presents with     Oncology Clinic Visit     Ductal carcinoma in situ (DCIS) of right breast     Initial Vitals: BP (!) 142/72 (BP Location: Right arm, Patient Position: Sitting, Cuff Size: Adult Regular)   Pulse 68   Temp 97.6  F (36.4  C) (Oral)   Resp 16   Ht 1.727 m (5' 8\")   Wt 60 kg (132 lb 3.2 oz)   SpO2 97%   BMI 20.10 kg/m    Estimated body mass index is 20.1 kg/m  as calculated from the following:    Height as of this encounter: 1.727 m (5' 8\").    Weight as of this encounter: 60 kg (132 lb 3.2 oz). Body surface area is 1.7 meters squared.  Mild Pain (2) Comment: Data Unavailable   No LMP recorded. Patient is postmenopausal.  Allergies reviewed: Yes  Medications reviewed: Yes    Medications: Medication refills not needed today.  Pharmacy name entered into [employer]:    [id number] - [place] - [address] AT [facility] [address] [facility] [address]  [facility] [place] - [address] [place] [address]    Clinical concerns: no           Sylvia Vargas CMA              [facility]    Hematology/Oncology Established Patient Follow-up Note      Today's Date: 10/15/2019    Reason for Follow-up: bilateral breast cancer    HISTORY OF PRESENT ILLNESS: Laverne Christensen is a 79 year old female who initially saw Dr. Lima for breast cancer.  She had a left lumpectomy on 03/08/1995 demonstrating a grade 1 lesion with 7 negative lymph nodes. She received Cytoxan, methotrexate and 5-FU and then tamoxifen until 12/2001 when the tamoxifen was discontinued. She also had received " radiation therapy to the left breast, completing it on 05/23/1995.   She was found to have a suspicious lesion in the right breast in 2011 on a routine mammogram. Biopsy showed ductal carcinoma in situ. The lesion measured 2 cm and was a grade 1 lesion without evidence of invasive disease. She went on to receive radiation therapy at [facility], receiving 4860 cGy in 33 fractions from 10/03/2011 through 11/16/2011. The tumor was triple negative. She is therefore being followed without hormonal therapy. She has never had recurrence of either breast cancer.      She saw Dr. Lima in 09/12/2013 she had undergone a routine CT scan of the chest showing a bilobed ground-glass nodular opacity in the right lung which was slightly more nodular and more prominent. She has a 30-pack-year smoking history. She was referred to Dr. Rupert Bryant, who referred her to Dr. Ravinder Figueroa. Dr. Figueroa performed a biopsy on the lesion in 10/2014. It was benign, and therefore follow-up chest CT's have been done.      On CT chest in April 2015, there was a new 2 cm pleural-based lung nodule in the right lower lobe.  She underwent wedge biopsy on 5/12/15, and it was benign.      INTERIM HISTORY:  Jazmin comes in for her regular follow-up for her history of breast cancer.  She says that she is doing well with regards to her history of breast cancer, but she has had some other health issues recently.  She had knee surgery and then got a foot ulcer.          REVIEW OF SYSTEMS:   14 point ROS was reviewed and is negative other than as noted above in HPI.        HOME MEDICATIONS:  Current Outpatient Medications   Medication Sig Dispense Refill     calcium carb 1250 mg, 500 mg [place],/vitamin D 200 units (OSCAL WITH D) 500-200 MG-UNIT per tablet Take 1 tablet by mouth 2 times daily (with meals)       Coenzyme Q10 (COQ10 PO) Take  by mouth daily.       LEVOTHYROXINE SODIUM 75 MCG PO TABS 1 TABLET DAILY       MULTIPLE VITAMIN PO None Entered        NEW MED daily Eye vitamin pill       VITAMIN C PO None Entered       ZINC PO None Entered       Alendronate Sodium (FOSAMAX PO) Take 70 mg by mouth once a week       ASPIRIN PO Take by mouth daily           ALLERGIES:  No Known Allergies      PAST MEDICAL HISTORY:  Past Medical History:   Diagnosis Date     Arthritis     DJD     COPD (chronic obstructive pulmonary disease) (H)      Osteopenia      Thyroid disease     HYPOTHYROIDISM         PAST SURGICAL HISTORY:  Past Surgical History:   Procedure Laterality Date     ABDOMEN SURGERY      SM INTESTINE SURGERY     APPENDECTOMY       BREAST SURGERY      BREAST LUMPECTOMY     ENT SURGERY      TONSILLECTOMY     HEAD & NECK SURGERY      THYROIDECTOMY     PHACOEMULSIFICATION CLEAR CORNEA WITH STANDARD INTRAOCULAR LENS IMPLANT Left 3/16/2016    Procedure: PHACOEMULSIFICATION CLEAR CORNEA WITH STANDARD INTRAOCULAR LENS IMPLANT;  Surgeon: Darius Finch MD;  Location: [facility]     PHACOEMULSIFICATION CLEAR CORNEA WITH STANDARD INTRAOCULAR LENS IMPLANT Right 3/30/2016    Procedure: PHACOEMULSIFICATION CLEAR CORNEA WITH STANDARD INTRAOCULAR LENS IMPLANT;  Surgeon: Darius Finch MD;  Location: [facility]     THORACIC SURGERY      LUNG NODULE REMOVAL     WEDGE RESECTION EYELID Left 12/4/2015    Procedure: WEDGE RESECTION EYELID;  Surgeon: Jone Acharya MD;  Location: [facility]         SOCIAL HISTORY:  Social History     Socioeconomic History     Marital status:      Spouse name: Not on file     Number of children: Not on file     Years of education: Not on file     Highest education level: Not on file   Occupational History     Not on file   Social Needs     Financial resource strain: Not on file     Food insecurity:     Worry: Not on file     Inability: Not on file     Transportation needs:     Medical: Not on file     Non-medical: Not on file   Tobacco Use     Smoking status: Current Every Day Smoker     Packs/day: 0.50     Types: Cigarettes     Smokeless tobacco:  "Current User   Substance and Sexual Activity     Alcohol use: Yes     Comment: daily cocktail     Drug use: No     Sexual activity: Not on file   Lifestyle     Physical activity:     Days per week: Not on file     Minutes per session: Not on file     Stress: Not on file   Relationships     Social connections:     Talks on phone: Not on file     Gets together: Not on file     Attends Amish service: Not on file     Active member of club or organization: Not on file     Attends meetings of clubs or organizations: Not on file     Relationship status: Not on file     Intimate partner violence:     Fear of current or ex partner: Not on file     Emotionally abused: Not on file     Physically abused: Not on file     Forced sexual activity: Not on file   Other Topics Concern     Not on file   Social History Narrative     Not on file         FAMILY HISTORY:  No family history on file.      PHYSICAL EXAM:  Vital signs:  BP (!) 142/72 (BP Location: Right arm, Patient Position: Sitting, Cuff Size: Adult Regular)   Pulse 68   Temp 97.6  F (36.4  C) (Oral)   Resp 16   Ht 1.727 m (5' 8\")   Wt 60 kg (132 lb 3.2 oz)   SpO2 97%   BMI 20.10 kg/m      ECO  GENERAL/CONSTITUTIONAL: No acute distress.  EYES: No scleral icterus.  LYMPH: No anterior cervical, posterior cervical, supraclavicular, or axillary adenopathy.   RESPIRATORY:  Clear to ausculation bilaterally.  CARDIOVASCULAR: Regular rate and rhythm without murmurs, gallops, or rubs.  GASTROINTESTINAL: No tenderness. The patient has normal bowel sounds. No guarding.  No distention.  BREAST: Difficult exam, as patient cannot get on exam table.  Right-no palpable mass, discharge, rash, or axillary lymphadenopathy.  Left-no palpable mass, discharge, rash, or axillary lymphadenopathy.    MUSCULOSKELETAL: Warm and well-perfused, no cyanosis, clubbing, or edema.  NEUROLOGIC: Alert, oriented, answers questions appropriately.  INTEGUMENTARY: No jaundice.  Scattered " moles.      LABS:  None today.      IMAGING:  Bilateral mammogram 10/09/19:  BI-RADS 2: benign       ASSESSMENT/PLAN:  Laverne Christensen is a 79 year old female:     1) Left sided breast cancer in 1995, and right sided DCIS in 2011: Now on surveillance.  Most recent bilateral mammogram done in 10/09/19 was reviewed, and is benign.  -continue yearly mammograms, next one due in October 2020.  She has them done at [facility].  -she would like to follow-up with her PCP from now on, which is reasonable.  She will continue to see her PCP, and get annual mammograms.  She can follow-up in oncology clinic as needed.      2) Pulmonary nodules: she does have a heavy smoking history.  There was a 2 cm pleural-based lung nodule in the right lower lobe that was biopsied and benign.  Other pulmonary nodules also being followed.  -patient follows with Dr. Figueroa  -she says that she has appointment with Dr. Figueroa coming up soon.      3) Smoking cessation: She continues to smoke half a pack a day.  She has tried medications and patches, but has been unsuccessful.  She says that she still has no motivation to quit.    4) She declined flu shot today.      I spent a total of 25 minutes with the patient, with over >50% of the time in counseling and/or coordination of care.       Heather Vincent MD  Hematology/Oncology  [facility]          Again, thank you for allowing me to participate in the care of your patient.        Sincerely,        Heather Vincent MD

## 2019-10-15 NOTE — PROGRESS NOTES
[facility]    Hematology/Oncology Established Patient Follow-up Note      Today's Date: 10/15/2019    Reason for Follow-up: bilateral breast cancer    HISTORY OF PRESENT ILLNESS: Laverne Christensen is a 79 year old female who initially saw Dr. Lima for breast cancer.  She had a left lumpectomy on 03/08/1995 demonstrating a grade 1 lesion with 7 negative lymph nodes. She received Cytoxan, methotrexate and 5-FU and then tamoxifen until 12/2001 when the tamoxifen was discontinued. She also had received radiation therapy to the left breast, completing it on 05/23/1995.   She was found to have a suspicious lesion in the right breast in 2011 on a routine mammogram. Biopsy showed ductal carcinoma in situ. The lesion measured 2 cm and was a grade 1 lesion without evidence of invasive disease. She went on to receive radiation therapy at [facility], receiving 4860 cGy in 33 fractions from 10/03/2011 through 11/16/2011. The tumor was triple negative. She is therefore being followed without hormonal therapy. She has never had recurrence of either breast cancer.      She saw Dr. Lima in 09/12/2013 she had undergone a routine CT scan of the chest showing a bilobed ground-glass nodular opacity in the right lung which was slightly more nodular and more prominent. She has a 30-pack-year smoking history. She was referred to Dr. Rupert Bryant, who referred her to Dr. Ravinder Figueroa. Dr. Figueroa performed a biopsy on the lesion in 10/2014. It was benign, and therefore follow-up chest CT's have been done.      On CT chest in April 2015, there was a new 2 cm pleural-based lung nodule in the right lower lobe.  She underwent wedge biopsy on 5/12/15, and it was benign.      INTERIM HISTORY:  Jazmin comes in for her regular follow-up for her history of breast cancer.  She says that she is doing well with regards to her history of breast cancer, but she has had some other health issues recently.  She had knee  surgery and then got a foot ulcer.          REVIEW OF SYSTEMS:   14 point ROS was reviewed and is negative other than as noted above in HPI.        HOME MEDICATIONS:  Current Outpatient Medications   Medication Sig Dispense Refill     calcium carb 1250 mg, 500 mg [place],/vitamin D 200 units (OSCAL WITH D) 500-200 MG-UNIT per tablet Take 1 tablet by mouth 2 times daily (with meals)       Coenzyme Q10 (COQ10 PO) Take  by mouth daily.       LEVOTHYROXINE SODIUM 75 MCG PO TABS 1 TABLET DAILY       MULTIPLE VITAMIN PO None Entered       NEW MED daily Eye vitamin pill       VITAMIN C PO None Entered       ZINC PO None Entered       Alendronate Sodium (FOSAMAX PO) Take 70 mg by mouth once a week       ASPIRIN PO Take by mouth daily           ALLERGIES:  No Known Allergies      PAST MEDICAL HISTORY:  Past Medical History:   Diagnosis Date     Arthritis     DJD     COPD (chronic obstructive pulmonary disease) (H)      Osteopenia      Thyroid disease     HYPOTHYROIDISM         PAST SURGICAL HISTORY:  Past Surgical History:   Procedure Laterality Date     ABDOMEN SURGERY      SM INTESTINE SURGERY     APPENDECTOMY       BREAST SURGERY      BREAST LUMPECTOMY     ENT SURGERY      TONSILLECTOMY     HEAD & NECK SURGERY      THYROIDECTOMY     PHACOEMULSIFICATION CLEAR CORNEA WITH STANDARD INTRAOCULAR LENS IMPLANT Left 3/16/2016    Procedure: PHACOEMULSIFICATION CLEAR CORNEA WITH STANDARD INTRAOCULAR LENS IMPLANT;  Surgeon: Darius Finch MD;  Location: [facility]     PHACOEMULSIFICATION CLEAR CORNEA WITH STANDARD INTRAOCULAR LENS IMPLANT Right 3/30/2016    Procedure: PHACOEMULSIFICATION CLEAR CORNEA WITH STANDARD INTRAOCULAR LENS IMPLANT;  Surgeon: Darius Finch MD;  Location: [facility]     THORACIC SURGERY      LUNG NODULE REMOVAL     WEDGE RESECTION EYELID Left 12/4/2015    Procedure: WEDGE RESECTION EYELID;  Surgeon: Jone Acharya MD;  Location: [facility]         SOCIAL HISTORY:  Social History     Socioeconomic History     Marital  "status:      Spouse name: Not on file     Number of children: Not on file     Years of education: Not on file     Highest education level: Not on file   Occupational History     Not on file   Social Needs     Financial resource strain: Not on file     Food insecurity:     Worry: Not on file     Inability: Not on file     Transportation needs:     Medical: Not on file     Non-medical: Not on file   Tobacco Use     Smoking status: Current Every Day Smoker     Packs/day: 0.50     Types: Cigarettes     Smokeless tobacco: Current User   Substance and Sexual Activity     Alcohol use: Yes     Comment: daily cocktail     Drug use: No     Sexual activity: Not on file   Lifestyle     Physical activity:     Days per week: Not on file     Minutes per session: Not on file     Stress: Not on file   Relationships     Social connections:     Talks on phone: Not on file     Gets together: Not on file     Attends Quaker service: Not on file     Active member of club or organization: Not on file     Attends meetings of clubs or organizations: Not on file     Relationship status: Not on file     Intimate partner violence:     Fear of current or ex partner: Not on file     Emotionally abused: Not on file     Physically abused: Not on file     Forced sexual activity: Not on file   Other Topics Concern     Not on file   Social History Narrative     Not on file         FAMILY HISTORY:  No family history on file.      PHYSICAL EXAM:  Vital signs:  BP (!) 142/72 (BP Location: Right arm, Patient Position: Sitting, Cuff Size: Adult Regular)   Pulse 68   Temp 97.6  F (36.4  C) (Oral)   Resp 16   Ht 1.727 m (5' 8\")   Wt 60 kg (132 lb 3.2 oz)   SpO2 97%   BMI 20.10 kg/m     ECO  GENERAL/CONSTITUTIONAL: No acute distress.  EYES: No scleral icterus.  LYMPH: No anterior cervical, posterior cervical, supraclavicular, or axillary adenopathy.   RESPIRATORY:  Clear to ausculation bilaterally.  CARDIOVASCULAR: Regular rate and " rhythm without murmurs, gallops, or rubs.  GASTROINTESTINAL: No tenderness. The patient has normal bowel sounds. No guarding.  No distention.  BREAST: Difficult exam, as patient cannot get on exam table.  Right-no palpable mass, discharge, rash, or axillary lymphadenopathy.  Left-no palpable mass, discharge, rash, or axillary lymphadenopathy.    MUSCULOSKELETAL: Warm and well-perfused, no cyanosis, clubbing, or edema.  NEUROLOGIC: Alert, oriented, answers questions appropriately.  INTEGUMENTARY: No jaundice.  Scattered moles.      LABS:  None today.      IMAGING:  Bilateral mammogram 10/09/19:  BI-RADS 2: benign       ASSESSMENT/PLAN:  Laverne Christensen is a 79 year old female:     1) Left sided breast cancer in 1995, and right sided DCIS in 2011: Now on surveillance.  Most recent bilateral mammogram done in 10/09/19 was reviewed, and is benign.  -continue yearly mammograms, next one due in October 2020.  She has them done at [facility].  -she would like to follow-up with her PCP from now on, which is reasonable.  She will continue to see her PCP, and get annual mammograms.  She can follow-up in oncology clinic as needed.      2) Pulmonary nodules: she does have a heavy smoking history.  There was a 2 cm pleural-based lung nodule in the right lower lobe that was biopsied and benign.  Other pulmonary nodules also being followed.  -patient follows with Dr. Figueroa  -she says that she has appointment with Dr. Figueroa coming up soon.      3) Smoking cessation: She continues to smoke half a pack a day.  She has tried medications and patches, but has been unsuccessful.  She says that she still has no motivation to quit.    4) She declined flu shot today.      I spent a total of 25 minutes with the patient, with over >50% of the time in counseling and/or coordination of care.       Heather Vincent MD  Hematology/Oncology  [facility]

## 2019-10-15 NOTE — PROGRESS NOTES
"Oncology Rooming Note    October 15, 2019 2:12 PM   Laverne Christensen is a 79 year old female who presents for:    Chief Complaint   Patient presents with     Oncology Clinic Visit     Ductal carcinoma in situ (DCIS) of right breast     Initial Vitals: BP (!) 142/72 (BP Location: Right arm, Patient Position: Sitting, Cuff Size: Adult Regular)   Pulse 68   Temp 97.6  F (36.4  C) (Oral)   Resp 16   Ht 1.727 m (5' 8\")   Wt 60 kg (132 lb 3.2 oz)   SpO2 97%   BMI 20.10 kg/m   Estimated body mass index is 20.1 kg/m  as calculated from the following:    Height as of this encounter: 1.727 m (5' 8\").    Weight as of this encounter: 60 kg (132 lb 3.2 oz). Body surface area is 1.7 meters squared.  Mild Pain (2) Comment: Data Unavailable   No LMP recorded. Patient is postmenopausal.  Allergies reviewed: Yes  Medications reviewed: Yes    Medications: Medication refills not needed today.  Pharmacy name entered into [employer]:    [facility] #[id number] - [place] - [address] AT [facility] [address]  [place] [facility]  [place] - [address] [place] [address]    Clinical concerns: no           Sylvia Vargas CMA            "

## 2019-11-19 ENCOUNTER — TELEPHONE (OUTPATIENT)
Dept: ONCOLOGY | Facility: CLINIC | Age: 79
End: 2019-11-19

## 2021-03-04 ENCOUNTER — APPOINTMENT (OUTPATIENT)
Dept: CT IMAGING | Facility: CLINIC | Age: 81
End: 2021-03-04
Attending: EMERGENCY MEDICINE
Payer: MEDICARE

## 2021-03-04 ENCOUNTER — HOSPITAL ENCOUNTER (EMERGENCY)
Facility: CLINIC | Age: 81
Discharge: HOME | End: 2021-03-05
Attending: EMERGENCY MEDICINE | Admitting: EMERGENCY MEDICINE
Payer: MEDICARE

## 2021-03-04 ENCOUNTER — MEDICAL CORRESPONDENCE (OUTPATIENT)
Dept: HEALTH INFORMATION MANAGEMENT | Facility: CLINIC | Age: 81
End: 2021-03-04

## 2021-03-04 DIAGNOSIS — F10.920 ALCOHOLIC INTOXICATION WITHOUT COMPLICATION (H): ICD-10-CM

## 2021-03-04 LAB
ALBUMIN SERPL-MCNC: 3.8 G/DL (ref 3.4–5)
ALP SERPL-CCNC: 54 U/L (ref 40–150)
ALT SERPL W P-5'-P-CCNC: 24 U/L (ref 0–50)
ANION GAP SERPL CALCULATED.3IONS-SCNC: 6 MMOL/L (ref 3–14)
AST SERPL W P-5'-P-CCNC: 21 U/L (ref 0–45)
BASOPHILS # BLD AUTO: 0.1 10E9/L (ref 0–0.2)
BASOPHILS NFR BLD AUTO: 0.7 %
BILIRUB SERPL-MCNC: 0.3 MG/DL (ref 0.2–1.3)
BUN SERPL-MCNC: 21 MG/DL (ref 7–30)
CALCIUM SERPL-MCNC: 9.2 MG/DL (ref 8.5–10.1)
CHLORIDE SERPL-SCNC: 104 MMOL/L (ref 94–109)
CO2 SERPL-SCNC: 29 MMOL/L (ref 20–32)
CREAT SERPL-MCNC: 0.61 MG/DL (ref 0.52–1.04)
DIFFERENTIAL METHOD BLD: ABNORMAL
EOSINOPHIL # BLD AUTO: 0.1 10E9/L (ref 0–0.7)
EOSINOPHIL NFR BLD AUTO: 1.4 %
ERYTHROCYTE [DISTWIDTH] IN BLOOD BY AUTOMATED COUNT: 15.1 % (ref 10–15)
GFR SERPL CREATININE-BSD FRML MDRD: 84 ML/MIN/{1.73_M2}
GLUCOSE SERPL-MCNC: 97 MG/DL (ref 70–99)
HCT VFR BLD AUTO: 39.6 % (ref 35–47)
HGB BLD-MCNC: 13 G/DL (ref 11.7–15.7)
IMM GRANULOCYTES # BLD: 0 10E9/L (ref 0–0.4)
IMM GRANULOCYTES NFR BLD: 0.3 %
INTERPRETATION ECG - MUSE: NORMAL
LYMPHOCYTES # BLD AUTO: 2.4 10E9/L (ref 0.8–5.3)
LYMPHOCYTES NFR BLD AUTO: 25.6 %
MCH RBC QN AUTO: 32.3 PG (ref 26.5–33)
MCHC RBC AUTO-ENTMCNC: 32.8 G/DL (ref 31.5–36.5)
MCV RBC AUTO: 98 FL (ref 78–100)
MONOCYTES # BLD AUTO: 1.1 10E9/L (ref 0–1.3)
MONOCYTES NFR BLD AUTO: 11.9 %
NEUTROPHILS # BLD AUTO: 5.6 10E9/L (ref 1.6–8.3)
NEUTROPHILS NFR BLD AUTO: 60.1 %
NRBC # BLD AUTO: 0 10*3/UL
NRBC BLD AUTO-RTO: 0 /100
PLATELET # BLD AUTO: 240 10E9/L (ref 150–450)
POTASSIUM SERPL-SCNC: 3.7 MMOL/L (ref 3.4–5.3)
PROT SERPL-MCNC: 6.9 G/DL (ref 6.8–8.8)
RBC # BLD AUTO: 4.03 10E12/L (ref 3.8–5.2)
SODIUM SERPL-SCNC: 139 MMOL/L (ref 133–144)
WBC # BLD AUTO: 9.4 10E9/L (ref 4–11)

## 2021-03-04 PROCEDURE — 93005 ELECTROCARDIOGRAM TRACING: CPT

## 2021-03-04 PROCEDURE — 70450 CT HEAD/BRAIN W/O DYE: CPT

## 2021-03-04 PROCEDURE — 80053 COMPREHEN METABOLIC PANEL: CPT | Performed by: EMERGENCY MEDICINE

## 2021-03-04 PROCEDURE — 72125 CT NECK SPINE W/O DYE: CPT

## 2021-03-04 PROCEDURE — 85025 COMPLETE CBC W/AUTO DIFF WBC: CPT | Performed by: EMERGENCY MEDICINE

## 2021-03-04 PROCEDURE — 99285 EMERGENCY DEPT VISIT HI MDM: CPT | Mod: 25

## 2021-03-05 ENCOUNTER — APPOINTMENT (OUTPATIENT)
Dept: GENERAL RADIOLOGY | Facility: CLINIC | Age: 81
End: 2021-03-05
Attending: EMERGENCY MEDICINE
Payer: MEDICARE

## 2021-03-05 VITALS
HEART RATE: 65 BPM | RESPIRATION RATE: 18 BRPM | SYSTOLIC BLOOD PRESSURE: 114 MMHG | TEMPERATURE: 97.9 F | OXYGEN SATURATION: 96 % | DIASTOLIC BLOOD PRESSURE: 59 MMHG

## 2021-03-05 PROCEDURE — 71045 X-RAY EXAM CHEST 1 VIEW: CPT

## 2021-03-05 ASSESSMENT — ENCOUNTER SYMPTOMS
SHORTNESS OF BREATH: 0
COUGH: 0
FEVER: 0

## 2021-03-05 NOTE — ED NOTES
Bed: ED30  Expected date: 3/4/21  Expected time: 10:40 PM  Means of arrival: Ambulance  Comments:  HEMS [id number] unable to care/self;ETOH; hold

## 2021-03-05 NOTE — ED PROVIDER NOTES
History   Chief Complaint:  Alcohol Intoxication     The history is provided by the patient.      Laverne Christensen is a 80 year old female with history of alcohol abuse, COPD, DCID of breast, intracranial hemorrhage following injury, SBO, and thyroid disease who presents via EMS with alcohol intoxication. Per EMS report, the patient suffered several falls today and notes she lives alone. 911 was called after her first fall where the officers helped her back up but did not transfer her anywhere. They were then called again later in the day after the patient fell yet again and appeared unsteady on her feet. Per breathalyzer, her EUGENIE was 0.20. They then transferred her here to the ED. Here, the patient is alert to place, reports feeling good, does not know why she is here, and denies any psychical pain.     Review of Systems   Constitutional: Negative for fever.   Respiratory: Negative for cough and shortness of breath.    Musculoskeletal: Positive for gait problem (secondary to alcohol).   All other systems reviewed and are negative.        Allergies:  No Known Allergies    Medications:  Ditropan  Levothyroxine  Gabapentin  Aspirin     Past Medical History:    Arthritis  COPD  Osteopenia  Thyroid disease  Alcohol abuse  SBO  Intracranial hemorrhage following injury  DCIS of breast      Past Surgical History:    Small intestine surgery  Appendectomy  Breast lumpectomy  Tonsillectomy  Thyroidectomy  Eye surgery x2  Lung nodule removal  Wedge resection eyelid    Family History:    Cancer  Heart failure: Mother     Social History:  Smoking status: Positive  Alcohol use: Positive  Drug use: Negative  Marital Status:   Presents to the ED via EMS.   PCP: Joel Jimenez     Physical Exam     Patient Vitals for the past 24 hrs:   BP Temp Temp src Pulse Resp SpO2   03/05/21 0430 133/61 -- -- 75 18 99 %   03/05/21 0400 119/56 -- -- 65 18 98 %   03/05/21 0330 108/57 -- -- 60 18 98 %   03/05/21 0300 120/66 -- -- 64  20 98 %   03/05/21 0230 115/52 -- -- 74 23 100 %   03/05/21 0200 114/52 -- -- 63 16 100 %   03/05/21 0145 110/54 -- -- 63 17 99 %   03/05/21 0117 -- -- -- 61 18 99 %   03/05/21 0100 -- -- -- 63 18 (!) 87 %   03/05/21 0000 -- -- -- 57 23 100 %   03/04/21 2330 119/60 -- -- 58 19 100 %   03/04/21 2300 101/56 -- -- -- -- --   03/04/21 2251 -- -- -- -- 18 --   03/04/21 2250 110/50 97.5  F (36.4  C) Oral 66 -- 92 %       Physical Exam  Vitals: reviewed by me  General: Pt seen on [facility], pleasant, cooperative, and alert to conversation.  Intoxicated appearing.  Eyes: Tracking well, clear conjunctiva BL  ENT: MMM, midline trachea.  No C-spine tenderness, no evidence of trauma to head or neck.  Lungs:   No tachypnea, no accessory muscle use. No respiratory distress.   CV: Rate as above, regular rhythm.    Abd: Soft, non tender, no guarding, no rebound. Non distended  MSK: no peripheral edema or joint effusion.  No evidence of trauma  Skin: No rash, normal turgor and temperature  Neuro: Clear speech and no facial droop.  Moving all extremity spontaneously, following all commands.  Psych: Not RIS, no e/o AH/VH      Emergency Department Course   ECG  ECG taken at 2308, ECG read at 2315  Rate 65 bpm. CO interval 232 ms. QRS duration 82 ms. QT/QTc 408/424 ms. P-R-T axes 85 -20 84.   Sinus rhythm with 1st degree AV block. Anterior infarct, age undetermined. Abnormal ECG.     Imaging:  CT Head without contrast:   No acute intracranial process. As per radiology.    CT Cervical Spine without contrast:   1.  Slight loss of height C7, appears chronic.  2.  No definite acute fracture.  3.  Degenerative changes, as described.  4.  Patchy and nodular incompletely visualized opacities right lung apex, indeterminate. If indicated, CT chest may be performed. As per radiology.    XR Chest, Portable, G/E 1 view:   Normal heart size and pulmonary vascularity. No acute appearing infiltrates or consolidation. Mild diffuse interstitial  prominence/scarring. Benign calcified granuloma left lung. Aortic calcification. Old left clavicle and old rib fractures. Degenerative changes both shoulders. Remainder unremarkable. No definite acute findings. As per radiology.    Laboratory:  CBC: WBC: 9.4, HGB: 13.0, PLT: 240  CMP: WNL (Creatinine: 0.61)     Emergency Department Course:    Reviewed:  I reviewed nursing notes and vitals    Assessments:  2307 I obtained history and examined the patient as noted above.     0240 I rechecked the patient and discussed the results of her workup thus far.     0555 I rechecked the patient and discussed the results of her workup thus far.     Disposition:  The patient was discharged to home.     Impression & Plan   Medical Decision Making:  Laverne Christensen is a 80 year old female who presents today with alcohol intoxication.  She has come to the ER before for following, possibility to alcohol.  Here in the ER, she denies any symptoms, initially was asking to go home, but seem to be too intoxicated and frail.  We kept her here in the ER, and she has been up occasionally, though not convincingly steady enough for discharge in the middle of the night in my opinion.  She is now resting comfortably, her CT scan of the head and neck are negative, a chest x-ray was done given her episode of hypoxia, though this seems mostly to be due to the alcohol in her system.  The x-ray is unremarkable.  I do think she stable for home when she is sober, she told me she does not want to be admitted to the hospital or consider placement, she is adamant that she wants to go home and I think that once she is sober this will be appropriate for her.  We try to call her son twice, but were unable to get through to him.    Diagnosis:    ICD-10-CM    1. Alcoholic intoxication without complication (H)  F10.920        Discharge Medications:  New Prescriptions    No medications on file       Scribe Disclosure:  I, Rosa Berman, am serving as a scribe on  3/4/2021 at 11:06 PM to personally document services performed by Bryon Murry MD, based on my observations and the provider's statements to me.      Bryon Murry MD  03/05/21 0529

## 2021-03-05 NOTE — ED TRIAGE NOTES
Patient arrives via EMS after having several falls today (lives alone).  Patient arrives on [place].  Per hold, officers where out at her house earlier today and assisted her back up.  Second call later patient fell again and was unsteady on her feet.  Breathalyzer 0.20. Patient denies any injuries.

## 2021-03-05 NOTE — ED NOTES
"Patient attempted to ambulate with walker and pulse oximeter. Patient able to ambulate with walker around the room at a slow pace, but pulse oximeter showed SpO2 dipping as low as 75%, remaining near 85-90% on average. Sheets noted to be soiled, and thus were changed before patient laid back down. Patient hostile towards staff, stating \"Get this IV out of me\" and \"I want to go home\". MD and RN aware.   "

## 2021-03-05 NOTE — ED NOTES
Patient assisted with phone call to her son. Patient states call went to voice mail. Patient left message for her son.

## 2021-03-05 NOTE — ED NOTES
Call light answered. Patient updated on disposition and [facility] arrival. Patient denies any further needs.

## 2021-03-05 NOTE — ED NOTES
[facility] called and spoke with patient. Son refused to provide ride for patient. [facility] called for wheel chair transport. Patient refused vital signs.

## 2021-03-05 NOTE — ED NOTES
Patient came to ED in night gown and call light pendant.  No pants, no shoes, no house keys or any other belongings.  Belongings placed in patient locker.

## 2021-03-05 NOTE — ED NOTES
9:02 AM - reassessed.  She is resting calmly comfortably and appears clinically sober with normal speech and goal-directed thought process.  Safe for discharge at this time.  Patient be discharged home by wheelchair van.  I stressed the importance of close outpatient follow-up, limiting her alcohol use.  Close return precautions were provided she was discharged in stable and improved condition.    Clinical impression:    ICD-10-CM    1. Alcoholic intoxication without complication (H)  F10.920        Disposition:  Discharge to home     Koko Cowan MD  03/05/21 0903

## 2021-03-05 NOTE — ED NOTES
Patient assisted to the bathroom. Patient steady on her feet. Patient states typically uses a cane with mobility. Patient updated on disposition status and this RN offered to call patient's son. Patient declined offer. MD notified.

## 2021-03-05 NOTE — ED NOTES
Patient woke up and is requesting to go home.  Patient is alert and oriented.  She says that her house is unlocked and she can get back into it.  MD at bedside re-evaluating.  Patient will be taken on a road test.

## 2021-10-20 ENCOUNTER — APPOINTMENT (OUTPATIENT)
Dept: CT IMAGING | Facility: CLINIC | Age: 81
End: 2021-10-20
Attending: EMERGENCY MEDICINE
Payer: MEDICARE

## 2021-10-20 ENCOUNTER — HOSPITAL ENCOUNTER (EMERGENCY)
Facility: CLINIC | Age: 81
Discharge: HOME | End: 2021-10-21
Attending: EMERGENCY MEDICINE | Admitting: EMERGENCY MEDICINE
Payer: MEDICARE

## 2021-10-20 VITALS
TEMPERATURE: 97.2 F | DIASTOLIC BLOOD PRESSURE: 61 MMHG | HEART RATE: 68 BPM | SYSTOLIC BLOOD PRESSURE: 134 MMHG | OXYGEN SATURATION: 90 % | RESPIRATION RATE: 18 BRPM

## 2021-10-20 DIAGNOSIS — S09.90XA CLOSED HEAD INJURY, INITIAL ENCOUNTER: ICD-10-CM

## 2021-10-20 DIAGNOSIS — R29.6 RECURRENT FALLS: ICD-10-CM

## 2021-10-20 DIAGNOSIS — F10.220 ACUTE ALCOHOLIC INTOXICATION IN ALCOHOLISM WITHOUT COMPLICATION (H): ICD-10-CM

## 2021-10-20 DIAGNOSIS — R53.1 WEAKNESS: ICD-10-CM

## 2021-10-20 LAB
ALBUMIN SERPL-MCNC: 3.5 G/DL (ref 3.4–5)
ALBUMIN UR-MCNC: NEGATIVE MG/DL
ALP SERPL-CCNC: 73 U/L (ref 40–150)
ALT SERPL W P-5'-P-CCNC: 23 U/L (ref 0–50)
ANION GAP SERPL CALCULATED.3IONS-SCNC: 8 MMOL/L (ref 3–14)
APPEARANCE UR: ABNORMAL
AST SERPL W P-5'-P-CCNC: 26 U/L (ref 0–45)
ATRIAL RATE - MUSE: 66 BPM
BACTERIA #/AREA URNS HPF: ABNORMAL /HPF
BASOPHILS # BLD AUTO: 0.1 10E3/UL (ref 0–0.2)
BASOPHILS NFR BLD AUTO: 1 %
BILIRUB SERPL-MCNC: 0.4 MG/DL (ref 0.2–1.3)
BILIRUB UR QL STRIP: NEGATIVE
BUN SERPL-MCNC: 21 MG/DL (ref 7–30)
CALCIUM SERPL-MCNC: 8.8 MG/DL (ref 8.5–10.1)
CHLORIDE BLD-SCNC: 102 MMOL/L (ref 94–109)
CO2 SERPL-SCNC: 25 MMOL/L (ref 20–32)
COLOR UR AUTO: ABNORMAL
CREAT SERPL-MCNC: 0.6 MG/DL (ref 0.52–1.04)
DIASTOLIC BLOOD PRESSURE - MUSE: NORMAL MMHG
EOSINOPHIL # BLD AUTO: 0.1 10E3/UL (ref 0–0.7)
EOSINOPHIL NFR BLD AUTO: 1 %
ERYTHROCYTE [DISTWIDTH] IN BLOOD BY AUTOMATED COUNT: 15.2 % (ref 10–15)
ETHANOL SERPL-MCNC: 0.21 G/DL
GFR SERPL CREATININE-BSD FRML MDRD: 85 ML/MIN/1.73M2
GLUCOSE BLD-MCNC: 127 MG/DL (ref 70–99)
GLUCOSE UR STRIP-MCNC: NEGATIVE MG/DL
HCT VFR BLD AUTO: 40.7 % (ref 35–47)
HGB BLD-MCNC: 13.6 G/DL (ref 11.7–15.7)
HGB UR QL STRIP: NEGATIVE
HOLD SPECIMEN: NORMAL
HOLD SPECIMEN: NORMAL
IMM GRANULOCYTES # BLD: 0 10E3/UL
IMM GRANULOCYTES NFR BLD: 0 %
INTERPRETATION ECG - MUSE: NORMAL
KETONES UR STRIP-MCNC: NEGATIVE MG/DL
LEUKOCYTE ESTERASE UR QL STRIP: NEGATIVE
LYMPHOCYTES # BLD AUTO: 1.1 10E3/UL (ref 0.8–5.3)
LYMPHOCYTES NFR BLD AUTO: 11 %
MCH RBC QN AUTO: 32.1 PG (ref 26.5–33)
MCHC RBC AUTO-ENTMCNC: 33.4 G/DL (ref 31.5–36.5)
MCV RBC AUTO: 96 FL (ref 78–100)
MONOCYTES # BLD AUTO: 0.8 10E3/UL (ref 0–1.3)
MONOCYTES NFR BLD AUTO: 9 %
NEUTROPHILS # BLD AUTO: 7.4 10E3/UL (ref 1.6–8.3)
NEUTROPHILS NFR BLD AUTO: 78 %
NITRATE UR QL: POSITIVE
NRBC # BLD AUTO: 0 10E3/UL
NRBC BLD AUTO-RTO: 0 /100
P AXIS - MUSE: 88 DEGREES
PH UR STRIP: 5.5 [PH] (ref 5–7)
PLATELET # BLD AUTO: 249 10E3/UL (ref 150–450)
POTASSIUM BLD-SCNC: 3.4 MMOL/L (ref 3.4–5.3)
PR INTERVAL - MUSE: 242 MS
PROT SERPL-MCNC: 7 G/DL (ref 6.8–8.8)
QRS DURATION - MUSE: 84 MS
QT - MUSE: 448 MS
QTC - MUSE: 469 MS
R AXIS - MUSE: -30 DEGREES
RBC # BLD AUTO: 4.24 10E6/UL (ref 3.8–5.2)
RBC URINE: <1 /HPF
SODIUM SERPL-SCNC: 135 MMOL/L (ref 133–144)
SP GR UR STRIP: 1.01 (ref 1–1.03)
SYSTOLIC BLOOD PRESSURE - MUSE: NORMAL MMHG
T AXIS - MUSE: 84 DEGREES
TROPONIN I SERPL-MCNC: <0.015 UG/L (ref 0–0.04)
TSH SERPL DL<=0.005 MIU/L-ACNC: 1.27 MU/L (ref 0.4–4)
UROBILINOGEN UR STRIP-MCNC: NORMAL MG/DL
VENTRICULAR RATE- MUSE: 66 BPM
WBC # BLD AUTO: 9.4 10E3/UL (ref 4–11)
WBC URINE: 1 /HPF

## 2021-10-20 PROCEDURE — 87086 URINE CULTURE/COLONY COUNT: CPT | Performed by: EMERGENCY MEDICINE

## 2021-10-20 PROCEDURE — 80053 COMPREHEN METABOLIC PANEL: CPT | Performed by: EMERGENCY MEDICINE

## 2021-10-20 PROCEDURE — 96360 HYDRATION IV INFUSION INIT: CPT

## 2021-10-20 PROCEDURE — 70450 CT HEAD/BRAIN W/O DYE: CPT

## 2021-10-20 PROCEDURE — 85025 COMPLETE CBC W/AUTO DIFF WBC: CPT | Performed by: EMERGENCY MEDICINE

## 2021-10-20 PROCEDURE — 72125 CT NECK SPINE W/O DYE: CPT

## 2021-10-20 PROCEDURE — 36415 COLL VENOUS BLD VENIPUNCTURE: CPT | Performed by: EMERGENCY MEDICINE

## 2021-10-20 PROCEDURE — 84484 ASSAY OF TROPONIN QUANT: CPT | Performed by: EMERGENCY MEDICINE

## 2021-10-20 PROCEDURE — 80051 ELECTROLYTE PANEL: CPT | Performed by: EMERGENCY MEDICINE

## 2021-10-20 PROCEDURE — 99285 EMERGENCY DEPT VISIT HI MDM: CPT | Mod: 25

## 2021-10-20 PROCEDURE — 82077 ASSAY SPEC XCP UR&BREATH IA: CPT | Performed by: EMERGENCY MEDICINE

## 2021-10-20 PROCEDURE — 258N000003 HC RX IP 258 OP 636: Performed by: EMERGENCY MEDICINE

## 2021-10-20 PROCEDURE — 93005 ELECTROCARDIOGRAM TRACING: CPT

## 2021-10-20 PROCEDURE — 84443 ASSAY THYROID STIM HORMONE: CPT | Performed by: EMERGENCY MEDICINE

## 2021-10-20 PROCEDURE — 81001 URINALYSIS AUTO W/SCOPE: CPT | Performed by: EMERGENCY MEDICINE

## 2021-10-20 RX ADMIN — SODIUM CHLORIDE 1000 ML: 9 INJECTION, SOLUTION INTRAVENOUS at 20:50

## 2021-10-20 ASSESSMENT — ENCOUNTER SYMPTOMS
NUMBNESS: 1
ABDOMINAL PAIN: 0
NAUSEA: 0
FREQUENCY: 0
DYSURIA: 0
BACK PAIN: 1
VOMITING: 0
CONFUSION: 1
NECK PAIN: 0

## 2021-10-21 NOTE — ED NOTES
Bed: ED21  Expected date: 10/20/21  Expected time: 8:20 PM  Means of arrival: Ambulance  Comments:  HEMS 448 81F fall; intox.
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
## 2021-10-21 NOTE — DISCHARGE INSTRUCTIONS

## 2021-10-21 NOTE — ED NOTES
Informed of health officer hold. Pt unhappy but cooperative. Pt tucked into bed and lights dimmed.
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
## 2021-10-21 NOTE — ED NOTES
Patient ambulated to restroom & back with standby assist.  States normally uses a cane during the day & has a walker by her bed to use at night.  Gait steady.  Able to get self out of bed independently & around restroom independently also.  States she feels pretty good & would like to go home.    Dr Segura aware.

## 2021-10-21 NOTE — ED PROVIDER NOTES
Pt alert and oriented and ambulatory.  Pt requesting discharge home.     Nick Segura MD  10/21/21 6651

## 2021-10-21 NOTE — ED NOTES
Kira parikh.  Patient wanting to go home.  Informed she is not able to go home until sober & medically clear per MD order.  Patient not happy with this but remains pleasant.

## 2021-10-22 NOTE — RESULT ENCOUNTER NOTE
[facility] Emergency Dept discharge antibiotic (if prescribed): None  No changes in treatment per [facility] ED Lab Result Urine culture protocol.

## 2021-10-23 ENCOUNTER — TELEPHONE (OUTPATIENT)
Dept: EMERGENCY MEDICINE | Facility: CLINIC | Age: 81
End: 2021-10-23

## 2021-10-23 LAB
BACTERIA UR CULT: ABNORMAL

## 2021-10-23 RX ORDER — CEFPODOXIME PROXETIL 100 MG/1
100 TABLET, FILM COATED ORAL 2 TIMES DAILY
Qty: 10 TABLET | Refills: 0 | Status: CANCELLED | OUTPATIENT
Start: 2021-10-23 | End: 2021-10-28

## 2021-10-23 NOTE — TELEPHONE ENCOUNTER
[facility] Emergency Department Lab result notification [Adult-Female]    [place] ED lab result protocol used  Urine Culture    Reason for call  Notify of lab results, assess symptoms,  review ED providers recommendations/discharge instructions (if necessary) and advise per ED lab result f/u protocol    Lab Result (including Rx patient on, if applicable)  Final urine culture on 10/23/21 shows the presence of bacteria(s): >100,000 CFU/mL Aerococcus urinae  [facility] Emergency Dept discharge antibiotic: None  Recommendations in treatment per [facility] ED lab result Urine Culture protocol.  Information table from Emergency Dept Provider visit on 10/20/21  Symptoms reported at ED visit (Chief complaint, HPI) Fall and Alcohol Intoxication      The history is provided by the patient and the EMS personnel.      Laverne Chirstensen is a 81 year old female with history of COPD, arthritis, neuropathy, hypothyroidism, and breast cancer who presents with concerns following a fall while intoxicated. The patient is coming from home where she lives alone. She had fallen and pushed her life alert button due to not being able to pick herself back up off of the floor. EMS reported that they witnessed her fall 5 times and each time she was unable to get up on her own. EMS also reports that her stove was on and the food that she was cooking was burnt. The patient did admit to drinking alcohol this evening, but she states she only had 1 drink. She usually drinks scotch and water and had that this evening. She explains that the reason she fell in the first place was due to her neuropathy; she explains that the numbness in her extremities will cause her to fall sometimes due to her arms and legs not working properly. She denies chest pain, abdominal pain, vomiting, nausea, neck pain, painful urination, and frequent urination. She denies any pain anywhere else in her body. She endorses back pain but states  that this is not new.       Significant Medical hx, if applicable (i.e. CKD, diabetes) Reviewed   Allergies No Known Allergies   Weight, if applicable Wt Readings from Last 2 Encounters:   10/15/19 60 kg (132 lb 3.2 oz)   10/12/18 56 kg (123 lb 6.4 oz)      Coumadin/Warfarin [Yes /No] No   Creatinine Level (mg/dl) Creatinine   Date Value Ref Range Status   10/20/2021 0.60 0.52 - 1.04 mg/dL Final   03/04/2021 0.61 0.52 - 1.04 mg/dL Final      Creatinine clearance (ml/min), if applicable Creatinine clearance cannot be calculated (Unknown ideal weight.)   Pregnant (Yes/No/NA) NA   Breastfeeding (Yes/No/NA) NA   ED providers Impression and Plan (applicable information) Patient is a 81-year-old female with a history of alcohol abuse who presents for evaluation of multiple falls while intoxicated.  CT imaging of the head and cervical spine were unremarkable.  No signs of acute fracture or intracranial hemorrhage.  Patient had evidence of bruising on her extremities but no new localizing pain to bilateral shoulders, elbows, wrists, hips, knees, ankles.  We did not obtain additional x-ray imaging as she has no other tenderness on my examination.  We did perform a work-up as a potential etiology for her recurrent falls but I have high suspicion this is due to intoxication.  Thankfully EKG showed no concerning arrhythmias or ischemic changes.  Troponin undetectable lower concern for ACS.  No evidence of renal failure, electrolyte abnormalities, or anemia.  Patient has been seen in various emergency departments several times this year for similar presentations.  She currently lives independently.  At this time she remains on a health officer hold due to intoxication and inability to care for self.  Assuming she rebekah appropriately I do feel that she would be okay to discharge back home.  I would certainly encourage the patient to seek alcohol counseling and treatment.  While awaiting clinical sobriety patient care signed out  to my partner Dr. Segura.   ED diagnosis  Acute alcoholic intoxication in alcoholism without complication (H)     Recurrent falls     Closed head injury, initial encounter     Weakness       ED provider Bandar Harris MD      RN Assessment (Patient s current Symptoms), include time called.  [Insert Left message here if message left]  3:26 Unable voicemail message requesting a call back to [facility] ED Lab Result RN at [phone].  RN is available every day between 9 a.m. and 5:30 p.m.            Genna Frias RN  [facility] [employer] [facility]  Emergency Dept Lab Result RN  Ph# [phone]     Copy of Lab result   Contains abnormal data Urine Culture  Order: [id number] - Reflex for Order [id number]  Status:  Final result   Visible to patient:  No (scheduled for 10/23/2021  4:04 PM) Next appt:  None  Specimen Information: Urine, Clean Catch         4 Result Notes    Culture Culture in progress       >100,000 CFU/mL Aerococcus urinaeAbnormal     Aerococcus species is usually susceptible to penicillin, cephalosporins, tetracycline and vancomycin.   10,000-50,000 CFU/mL Escherichia coliAbnormal             Resulting Agency: [facility]       Susceptibility     Escherichia coli     YANIQUE     Ampicillin <=2.0 ug/mL Susceptible     Ampicillin/ Sulbactam <=2.0 ug/mL Susceptible     Cefazolin <=4.0 ug/mL Susceptible 1     Cefepime <=1.0 ug/mL Susceptible     Cefoxitin <=4.0 ug/mL Susceptible     Ceftazidime <=1.0 ug/mL Susceptible     Ceftriaxone <=1.0 ug/mL Susceptible     Ciprofloxacin <=0.25 ug/mL Susceptible     Gentamicin 4.0 ug/mL Susceptible     Levofloxacin <=0.12 ug/mL Susceptible     Nitrofurantoin <=16.0 ug/mL Susceptible     Piperacillin/Tazobactam 64.0 ug/mL Intermediate     Tobramycin 4.0 ug/mL Susceptible     Trimethoprim/Sulfamethoxazole <=1/19 ug/mL Susceptible           1 Cefazolin YANIQUE breakpoints are for the treatment of uncomplicated urinary tract infections. For the treatment of  systemic infections, please contact the laboratory for additional testing.            Specimen Collected: 10/20/21  9:27 PM Last Resulted: 10/23/21  3:04 PM

## 2021-10-25 NOTE — TELEPHONE ENCOUNTER
"[facility] Emergency Department Lab result notification [Adult-Female]     [facility] ED lab result protocol used  Urine Culture     Reason for call  Notify of lab results, assess symptoms,  review ED providers recommendations/discharge instructions (if necessary) and advise per ED lab result f/u protocol     Lab Result (including Rx patient on, if applicable)  Final urine culture on 10/23/21 shows the presence of bacteria(s): >100,000 CFU/mL Aerococcus urinae  [facility] Emergency Dept discharge antibiotic: None  Recommendations in treatment per [facility] ED lab result Urine Culture protocol.    RN Assessment (Patient s current Symptoms), include time called.  [Insert Left message here if message left]  9:23AM: Spoke with patient. She states she is doing fine. Denies any urinary symptoms, no frequency, urgency or pain with urination. States that she wears a pad because she \"leaks\" but that that is normal for her. Denies any fever, abdominal pain or back pain.     RN Recommendations/Instructions per [facility] ED lab result protocol  Patient notified of lab result and treatment recommendations.  Advised per ED lab urine culture protocol to follow up with her PCP within a week to have her urine retested since she is not having any urinary symptoms.  The patient is comfortable with the information given and has no further questions.      Please Contact your PCP clinic or return to the Emergency department if your:    Symptoms return.    Symptoms worsen or other concerning symptom's.    PCP follow-up Questions asked: YES       Ana Magana RN  [facility] [place]  Emergency Dept Lab Result RN  Ph# [phone]     "

## 2022-01-01 ENCOUNTER — LAB REQUISITION (OUTPATIENT)
Dept: LAB | Facility: CLINIC | Age: 82
End: 2022-01-01
Payer: MEDICARE

## 2022-01-01 DIAGNOSIS — U07.1 COVID-19: ICD-10-CM

## 2022-01-01 LAB
SARS-COV-2 RNA RESP QL NAA+PROBE: NEGATIVE

## 2022-01-01 PROCEDURE — U0003 INFECTIOUS AGENT DETECTION BY NUCLEIC ACID (DNA OR RNA); SEVERE ACUTE RESPIRATORY SYNDROME CORONAVIRUS 2 (SARS-COV-2) (CORONAVIRUS DISEASE [COVID-19]), AMPLIFIED PROBE TECHNIQUE, MAKING USE OF HIGH THROUGHPUT TECHNOLOGIES AS DESCRIBED BY CMS-2020-01-R: HCPCS | Mod: ORL | Performed by: INTERNAL MEDICINE

## 2022-01-01 PROCEDURE — U0003 INFECTIOUS AGENT DETECTION BY NUCLEIC ACID (DNA OR RNA); SEVERE ACUTE RESPIRATORY SYNDROME CORONAVIRUS 2 (SARS-COV-2) (CORONAVIRUS DISEASE [COVID-19]), AMPLIFIED PROBE TECHNIQUE, MAKING USE OF HIGH THROUGHPUT TECHNOLOGIES AS DESCRIBED BY CMS-2020-01-R: HCPCS | Mod: ORL

## 2022-01-01 PROCEDURE — U0005 INFEC AGEN DETEC AMPLI PROBE: HCPCS | Mod: ORL

## 2022-03-20 PROCEDURE — U0005 INFEC AGEN DETEC AMPLI PROBE: HCPCS | Performed by: FAMILY MEDICINE

## 2022-03-21 ENCOUNTER — LAB REQUISITION (OUTPATIENT)
Dept: LAB | Facility: CLINIC | Age: 82
End: 2022-03-21

## 2022-03-21 DIAGNOSIS — U07.1 COVID-19: ICD-10-CM

## 2022-03-21 LAB — SARS-COV-2 RNA RESP QL NAA+PROBE: NEGATIVE

## 2022-03-22 ENCOUNTER — LAB REQUISITION (OUTPATIENT)
Dept: LAB | Facility: CLINIC | Age: 82
End: 2022-03-22
Payer: COMMERCIAL

## 2022-03-22 DIAGNOSIS — L97.923: ICD-10-CM

## 2022-03-23 ENCOUNTER — LAB REQUISITION (OUTPATIENT)
Dept: LAB | Facility: CLINIC | Age: 82
End: 2022-03-23
Payer: COMMERCIAL

## 2022-03-23 DIAGNOSIS — L97.923: ICD-10-CM

## 2022-03-23 LAB
ALT SERPL W P-5'-P-CCNC: 22 U/L (ref 0–45)
BASOPHILS # BLD AUTO: 0.1 10E3/UL (ref 0–0.2)
BASOPHILS NFR BLD AUTO: 1 %
C REACTIVE PROTEIN LHE: 1.4 MG/DL (ref 0–0.8)
CREAT SERPL-MCNC: 0.69 MG/DL (ref 0.6–1.1)
EOSINOPHIL # BLD AUTO: 0.8 10E3/UL (ref 0–0.7)
EOSINOPHIL NFR BLD AUTO: 11 %
ERYTHROCYTE [DISTWIDTH] IN BLOOD BY AUTOMATED COUNT: 15.1 % (ref 10–15)
GFR SERPL CREATININE-BSD FRML MDRD: 86 ML/MIN/1.73M2
HCT VFR BLD AUTO: 36 % (ref 35–47)
HGB BLD-MCNC: 11.5 G/DL (ref 11.7–15.7)
IMM GRANULOCYTES # BLD: 0 10E3/UL
IMM GRANULOCYTES NFR BLD: 0 %
LYMPHOCYTES # BLD AUTO: 1.2 10E3/UL (ref 0.8–5.3)
LYMPHOCYTES NFR BLD AUTO: 17 %
MCH RBC QN AUTO: 30.9 PG (ref 26.5–33)
MCHC RBC AUTO-ENTMCNC: 31.9 G/DL (ref 31.5–36.5)
MCV RBC AUTO: 97 FL (ref 78–100)
MONOCYTES # BLD AUTO: 0.7 10E3/UL (ref 0–1.3)
MONOCYTES NFR BLD AUTO: 10 %
NEUTROPHILS # BLD AUTO: 4.3 10E3/UL (ref 1.6–8.3)
NEUTROPHILS NFR BLD AUTO: 61 %
NRBC # BLD AUTO: 0 10E3/UL
NRBC BLD AUTO-RTO: 0 /100
PLATELET # BLD AUTO: 499 10E3/UL (ref 150–450)
RBC # BLD AUTO: 3.72 10E6/UL (ref 3.8–5.2)
VANCOMYCIN SERPL-MCNC: 32.6 MG/L
WBC # BLD AUTO: 7 10E3/UL (ref 4–11)

## 2022-03-23 PROCEDURE — 80202 ASSAY OF VANCOMYCIN: CPT | Performed by: INTERNAL MEDICINE

## 2022-03-23 PROCEDURE — 85025 COMPLETE CBC W/AUTO DIFF WBC: CPT | Performed by: INTERNAL MEDICINE

## 2022-03-23 PROCEDURE — 36415 COLL VENOUS BLD VENIPUNCTURE: CPT | Performed by: INTERNAL MEDICINE

## 2022-03-23 PROCEDURE — 86140 C-REACTIVE PROTEIN: CPT | Performed by: INTERNAL MEDICINE

## 2022-03-23 PROCEDURE — P9604 ONE-WAY ALLOW PRORATED TRIP: HCPCS | Performed by: INTERNAL MEDICINE

## 2022-03-23 PROCEDURE — 82565 ASSAY OF CREATININE: CPT | Performed by: INTERNAL MEDICINE

## 2022-03-23 PROCEDURE — 84460 ALANINE AMINO (ALT) (SGPT): CPT | Performed by: INTERNAL MEDICINE

## 2022-03-24 ENCOUNTER — LAB REQUISITION (OUTPATIENT)
Dept: LAB | Facility: CLINIC | Age: 82
End: 2022-03-24
Payer: COMMERCIAL

## 2022-03-24 DIAGNOSIS — L97.923: ICD-10-CM

## 2022-03-24 LAB
BASOPHILS # BLD AUTO: 0.1 10E3/UL (ref 0–0.2)
BASOPHILS NFR BLD AUTO: 2 %
CREAT SERPL-MCNC: 0.72 MG/DL (ref 0.6–1.1)
EOSINOPHIL # BLD AUTO: 0.7 10E3/UL (ref 0–0.7)
EOSINOPHIL NFR BLD AUTO: 10 %
ERYTHROCYTE [DISTWIDTH] IN BLOOD BY AUTOMATED COUNT: 14.8 % (ref 10–15)
GFR SERPL CREATININE-BSD FRML MDRD: 83 ML/MIN/1.73M2
HCT VFR BLD AUTO: 34 % (ref 35–47)
HGB BLD-MCNC: 10.9 G/DL (ref 11.7–15.7)
IMM GRANULOCYTES # BLD: 0 10E3/UL
IMM GRANULOCYTES NFR BLD: 0 %
LYMPHOCYTES # BLD AUTO: 1.5 10E3/UL (ref 0.8–5.3)
LYMPHOCYTES NFR BLD AUTO: 21 %
MCH RBC QN AUTO: 30.3 PG (ref 26.5–33)
MCHC RBC AUTO-ENTMCNC: 32.1 G/DL (ref 31.5–36.5)
MCV RBC AUTO: 94 FL (ref 78–100)
MONOCYTES # BLD AUTO: 0.8 10E3/UL (ref 0–1.3)
MONOCYTES NFR BLD AUTO: 11 %
NEUTROPHILS # BLD AUTO: 3.8 10E3/UL (ref 1.6–8.3)
NEUTROPHILS NFR BLD AUTO: 56 %
NRBC # BLD AUTO: 0 10E3/UL
NRBC BLD AUTO-RTO: 0 /100
PLATELET # BLD AUTO: 447 10E3/UL (ref 150–450)
RBC # BLD AUTO: 3.6 10E6/UL (ref 3.8–5.2)
VANCOMYCIN SERPL-MCNC: 24.7 MG/L
WBC # BLD AUTO: 7 10E3/UL (ref 4–11)

## 2022-03-24 PROCEDURE — 36415 COLL VENOUS BLD VENIPUNCTURE: CPT | Performed by: INTERNAL MEDICINE

## 2022-03-24 PROCEDURE — 80202 ASSAY OF VANCOMYCIN: CPT | Performed by: INTERNAL MEDICINE

## 2022-03-24 PROCEDURE — P9604 ONE-WAY ALLOW PRORATED TRIP: HCPCS | Performed by: INTERNAL MEDICINE

## 2022-03-24 PROCEDURE — 85025 COMPLETE CBC W/AUTO DIFF WBC: CPT | Performed by: INTERNAL MEDICINE

## 2022-03-24 PROCEDURE — 82565 ASSAY OF CREATININE: CPT | Performed by: INTERNAL MEDICINE

## 2022-03-25 ENCOUNTER — LAB REQUISITION (OUTPATIENT)
Dept: LAB | Facility: CLINIC | Age: 82
End: 2022-03-25
Payer: COMMERCIAL

## 2022-03-25 DIAGNOSIS — L97.923: ICD-10-CM

## 2022-03-25 LAB
ALT SERPL W P-5'-P-CCNC: 23 U/L (ref 0–45)
BASOPHILS # BLD AUTO: 0.1 10E3/UL (ref 0–0.2)
BASOPHILS NFR BLD AUTO: 2 %
C REACTIVE PROTEIN LHE: 1.5 MG/DL (ref 0–0.8)
CREAT SERPL-MCNC: 0.7 MG/DL (ref 0.6–1.1)
EOSINOPHIL # BLD AUTO: 0.4 10E3/UL (ref 0–0.7)
EOSINOPHIL NFR BLD AUTO: 7 %
ERYTHROCYTE [DISTWIDTH] IN BLOOD BY AUTOMATED COUNT: 14.8 % (ref 10–15)
GFR SERPL CREATININE-BSD FRML MDRD: 85 ML/MIN/1.73M2
HCT VFR BLD AUTO: 37.8 % (ref 35–47)
HGB BLD-MCNC: 11.8 G/DL (ref 11.7–15.7)
IMM GRANULOCYTES # BLD: 0 10E3/UL
IMM GRANULOCYTES NFR BLD: 0 %
LYMPHOCYTES # BLD AUTO: 1.4 10E3/UL (ref 0.8–5.3)
LYMPHOCYTES NFR BLD AUTO: 21 %
MCH RBC QN AUTO: 29.9 PG (ref 26.5–33)
MCHC RBC AUTO-ENTMCNC: 31.2 G/DL (ref 31.5–36.5)
MCV RBC AUTO: 96 FL (ref 78–100)
MONOCYTES # BLD AUTO: 0.7 10E3/UL (ref 0–1.3)
MONOCYTES NFR BLD AUTO: 10 %
NEUTROPHILS # BLD AUTO: 3.9 10E3/UL (ref 1.6–8.3)
NEUTROPHILS NFR BLD AUTO: 60 %
NRBC # BLD AUTO: 0 10E3/UL
NRBC BLD AUTO-RTO: 0 /100
PLATELET # BLD AUTO: 448 10E3/UL (ref 150–450)
RBC # BLD AUTO: 3.95 10E6/UL (ref 3.8–5.2)
VANCOMYCIN SERPL-MCNC: 13.8 MG/L
WBC # BLD AUTO: 6.5 10E3/UL (ref 4–11)

## 2022-03-25 PROCEDURE — 80202 ASSAY OF VANCOMYCIN: CPT | Performed by: INTERNAL MEDICINE

## 2022-03-25 PROCEDURE — P9604 ONE-WAY ALLOW PRORATED TRIP: HCPCS | Performed by: INTERNAL MEDICINE

## 2022-03-25 PROCEDURE — 82565 ASSAY OF CREATININE: CPT | Performed by: INTERNAL MEDICINE

## 2022-03-25 PROCEDURE — 85025 COMPLETE CBC W/AUTO DIFF WBC: CPT | Performed by: INTERNAL MEDICINE

## 2022-03-25 PROCEDURE — 36415 COLL VENOUS BLD VENIPUNCTURE: CPT | Performed by: INTERNAL MEDICINE

## 2022-03-25 PROCEDURE — 86140 C-REACTIVE PROTEIN: CPT | Performed by: INTERNAL MEDICINE

## 2022-03-25 PROCEDURE — 84460 ALANINE AMINO (ALT) (SGPT): CPT | Performed by: INTERNAL MEDICINE

## 2022-03-28 LAB
ALT SERPL W P-5'-P-CCNC: 19 U/L (ref 0–45)
BASOPHILS # BLD AUTO: 0.1 10E3/UL (ref 0–0.2)
BASOPHILS NFR BLD AUTO: 2 %
C REACTIVE PROTEIN LHE: 1.5 MG/DL (ref 0–0.8)
CREAT SERPL-MCNC: 0.69 MG/DL (ref 0.6–1.1)
EOSINOPHIL # BLD AUTO: 0.7 10E3/UL (ref 0–0.7)
EOSINOPHIL NFR BLD AUTO: 9 %
ERYTHROCYTE [DISTWIDTH] IN BLOOD BY AUTOMATED COUNT: 14.5 % (ref 10–15)
GFR SERPL CREATININE-BSD FRML MDRD: 86 ML/MIN/1.73M2
HCT VFR BLD AUTO: 36.6 % (ref 35–47)
HGB BLD-MCNC: 11.5 G/DL (ref 11.7–15.7)
IMM GRANULOCYTES # BLD: 0 10E3/UL
IMM GRANULOCYTES NFR BLD: 0 %
LYMPHOCYTES # BLD AUTO: 1.5 10E3/UL (ref 0.8–5.3)
LYMPHOCYTES NFR BLD AUTO: 21 %
MCH RBC QN AUTO: 30.1 PG (ref 26.5–33)
MCHC RBC AUTO-ENTMCNC: 31.4 G/DL (ref 31.5–36.5)
MCV RBC AUTO: 96 FL (ref 78–100)
MONOCYTES # BLD AUTO: 0.9 10E3/UL (ref 0–1.3)
MONOCYTES NFR BLD AUTO: 12 %
NEUTROPHILS # BLD AUTO: 4.1 10E3/UL (ref 1.6–8.3)
NEUTROPHILS NFR BLD AUTO: 56 %
NRBC # BLD AUTO: 0 10E3/UL
NRBC BLD AUTO-RTO: 0 /100
PLATELET # BLD AUTO: 382 10E3/UL (ref 150–450)
RBC # BLD AUTO: 3.82 10E6/UL (ref 3.8–5.2)
VANCOMYCIN SERPL-MCNC: 14.4 MG/L
WBC # BLD AUTO: 7.4 10E3/UL (ref 4–11)

## 2022-03-28 PROCEDURE — 36415 COLL VENOUS BLD VENIPUNCTURE: CPT | Performed by: INTERNAL MEDICINE

## 2022-03-28 PROCEDURE — 85025 COMPLETE CBC W/AUTO DIFF WBC: CPT | Performed by: INTERNAL MEDICINE

## 2022-03-28 PROCEDURE — P9604 ONE-WAY ALLOW PRORATED TRIP: HCPCS | Performed by: INTERNAL MEDICINE

## 2022-03-28 PROCEDURE — 80202 ASSAY OF VANCOMYCIN: CPT | Performed by: INTERNAL MEDICINE

## 2022-03-28 PROCEDURE — 86140 C-REACTIVE PROTEIN: CPT | Performed by: INTERNAL MEDICINE

## 2022-03-28 PROCEDURE — 82565 ASSAY OF CREATININE: CPT | Performed by: INTERNAL MEDICINE

## 2022-03-28 PROCEDURE — 84460 ALANINE AMINO (ALT) (SGPT): CPT | Performed by: INTERNAL MEDICINE

## 2022-03-30 ENCOUNTER — LAB REQUISITION (OUTPATIENT)
Dept: LAB | Facility: CLINIC | Age: 82
End: 2022-03-30
Payer: MEDICARE

## 2022-03-30 DIAGNOSIS — L97.923: ICD-10-CM

## 2022-03-31 LAB
ALT SERPL W P-5'-P-CCNC: 17 U/L (ref 0–45)
BASOPHILS # BLD AUTO: 0.1 10E3/UL (ref 0–0.2)
BASOPHILS NFR BLD AUTO: 1 %
C REACTIVE PROTEIN LHE: 2.3 MG/DL (ref 0–0.8)
CREAT SERPL-MCNC: 0.62 MG/DL (ref 0.6–1.1)
EOSINOPHIL # BLD AUTO: 0.6 10E3/UL (ref 0–0.7)
EOSINOPHIL NFR BLD AUTO: 6 %
ERYTHROCYTE [DISTWIDTH] IN BLOOD BY AUTOMATED COUNT: 14.5 % (ref 10–15)
GFR SERPL CREATININE-BSD FRML MDRD: 88 ML/MIN/1.73M2
HCT VFR BLD AUTO: 34 % (ref 35–47)
HGB BLD-MCNC: 10.8 G/DL (ref 11.7–15.7)
IMM GRANULOCYTES # BLD: 0 10E3/UL
IMM GRANULOCYTES NFR BLD: 0 %
LYMPHOCYTES # BLD AUTO: 1.6 10E3/UL (ref 0.8–5.3)
LYMPHOCYTES NFR BLD AUTO: 16 %
MCH RBC QN AUTO: 30.2 PG (ref 26.5–33)
MCHC RBC AUTO-ENTMCNC: 31.8 G/DL (ref 31.5–36.5)
MCV RBC AUTO: 95 FL (ref 78–100)
MONOCYTES # BLD AUTO: 0.9 10E3/UL (ref 0–1.3)
MONOCYTES NFR BLD AUTO: 10 %
NEUTROPHILS # BLD AUTO: 6.4 10E3/UL (ref 1.6–8.3)
NEUTROPHILS NFR BLD AUTO: 67 %
NRBC # BLD AUTO: 0 10E3/UL
NRBC BLD AUTO-RTO: 0 /100
PLATELET # BLD AUTO: 329 10E3/UL (ref 150–450)
RBC # BLD AUTO: 3.58 10E6/UL (ref 3.8–5.2)
VANCOMYCIN SERPL-MCNC: 13.3 MG/L
WBC # BLD AUTO: 9.6 10E3/UL (ref 4–11)

## 2022-03-31 PROCEDURE — P9604 ONE-WAY ALLOW PRORATED TRIP: HCPCS | Performed by: INTERNAL MEDICINE

## 2022-03-31 PROCEDURE — 85025 COMPLETE CBC W/AUTO DIFF WBC: CPT | Performed by: INTERNAL MEDICINE

## 2022-03-31 PROCEDURE — 80202 ASSAY OF VANCOMYCIN: CPT | Performed by: INTERNAL MEDICINE

## 2022-03-31 PROCEDURE — 82565 ASSAY OF CREATININE: CPT | Performed by: INTERNAL MEDICINE

## 2022-03-31 PROCEDURE — 36415 COLL VENOUS BLD VENIPUNCTURE: CPT | Performed by: INTERNAL MEDICINE

## 2022-03-31 PROCEDURE — 86140 C-REACTIVE PROTEIN: CPT | Performed by: INTERNAL MEDICINE

## 2022-03-31 PROCEDURE — 84460 ALANINE AMINO (ALT) (SGPT): CPT | Performed by: INTERNAL MEDICINE

## 2022-04-01 ENCOUNTER — LAB REQUISITION (OUTPATIENT)
Dept: LAB | Facility: CLINIC | Age: 82
End: 2022-04-01
Payer: MEDICARE

## 2022-04-01 DIAGNOSIS — L97.923: ICD-10-CM

## 2022-04-04 LAB
ALT SERPL W P-5'-P-CCNC: 22 U/L (ref 0–45)
BASOPHILS # BLD AUTO: 0.1 10E3/UL (ref 0–0.2)
BASOPHILS NFR BLD AUTO: 2 %
C REACTIVE PROTEIN LHE: 3.2 MG/DL (ref 0–0.8)
CREAT SERPL-MCNC: 0.69 MG/DL (ref 0.6–1.1)
EOSINOPHIL # BLD AUTO: 0.7 10E3/UL (ref 0–0.7)
EOSINOPHIL NFR BLD AUTO: 11 %
ERYTHROCYTE [DISTWIDTH] IN BLOOD BY AUTOMATED COUNT: 14.6 % (ref 10–15)
GFR SERPL CREATININE-BSD FRML MDRD: 86 ML/MIN/1.73M2
HCT VFR BLD AUTO: 38 % (ref 35–47)
HGB BLD-MCNC: 11.8 G/DL (ref 11.7–15.7)
IMM GRANULOCYTES # BLD: 0 10E3/UL
IMM GRANULOCYTES NFR BLD: 0 %
LYMPHOCYTES # BLD AUTO: 1.2 10E3/UL (ref 0.8–5.3)
LYMPHOCYTES NFR BLD AUTO: 20 %
MCH RBC QN AUTO: 29.8 PG (ref 26.5–33)
MCHC RBC AUTO-ENTMCNC: 31.1 G/DL (ref 31.5–36.5)
MCV RBC AUTO: 96 FL (ref 78–100)
MONOCYTES # BLD AUTO: 0.7 10E3/UL (ref 0–1.3)
MONOCYTES NFR BLD AUTO: 11 %
NEUTROPHILS # BLD AUTO: 3.3 10E3/UL (ref 1.6–8.3)
NEUTROPHILS NFR BLD AUTO: 56 %
NRBC # BLD AUTO: 0 10E3/UL
NRBC BLD AUTO-RTO: 0 /100
PLATELET # BLD AUTO: 311 10E3/UL (ref 150–450)
RBC # BLD AUTO: 3.96 10E6/UL (ref 3.8–5.2)
VANCOMYCIN SERPL-MCNC: 14.8 MG/L
WBC # BLD AUTO: 6 10E3/UL (ref 4–11)

## 2022-04-04 PROCEDURE — 80202 ASSAY OF VANCOMYCIN: CPT | Performed by: INTERNAL MEDICINE

## 2022-04-04 PROCEDURE — 36415 COLL VENOUS BLD VENIPUNCTURE: CPT | Performed by: INTERNAL MEDICINE

## 2022-04-04 PROCEDURE — 82565 ASSAY OF CREATININE: CPT | Performed by: INTERNAL MEDICINE

## 2022-04-04 PROCEDURE — 84460 ALANINE AMINO (ALT) (SGPT): CPT | Performed by: INTERNAL MEDICINE

## 2022-04-04 PROCEDURE — P9604 ONE-WAY ALLOW PRORATED TRIP: HCPCS | Performed by: INTERNAL MEDICINE

## 2022-04-04 PROCEDURE — 85004 AUTOMATED DIFF WBC COUNT: CPT | Performed by: INTERNAL MEDICINE

## 2022-04-04 PROCEDURE — 86140 C-REACTIVE PROTEIN: CPT | Performed by: INTERNAL MEDICINE

## 2022-04-06 ENCOUNTER — LAB REQUISITION (OUTPATIENT)
Dept: LAB | Facility: CLINIC | Age: 82
End: 2022-04-06
Payer: MEDICARE

## 2022-04-06 DIAGNOSIS — L97.923: ICD-10-CM

## 2022-04-07 LAB
ALT SERPL W P-5'-P-CCNC: 18 U/L (ref 0–45)
BASOPHILS # BLD AUTO: 0.1 10E3/UL (ref 0–0.2)
BASOPHILS NFR BLD AUTO: 1 %
C REACTIVE PROTEIN LHE: 3.9 MG/DL (ref 0–0.8)
CREAT SERPL-MCNC: 0.74 MG/DL (ref 0.6–1.1)
EOSINOPHIL # BLD AUTO: 0.5 10E3/UL (ref 0–0.7)
EOSINOPHIL NFR BLD AUTO: 7 %
ERYTHROCYTE [DISTWIDTH] IN BLOOD BY AUTOMATED COUNT: 14.6 % (ref 10–15)
GFR SERPL CREATININE-BSD FRML MDRD: 80 ML/MIN/1.73M2
HCT VFR BLD AUTO: 37.2 % (ref 35–47)
HGB BLD-MCNC: 11.8 G/DL (ref 11.7–15.7)
IMM GRANULOCYTES # BLD: 0 10E3/UL
IMM GRANULOCYTES NFR BLD: 0 %
LYMPHOCYTES # BLD AUTO: 1.3 10E3/UL (ref 0.8–5.3)
LYMPHOCYTES NFR BLD AUTO: 18 %
MCH RBC QN AUTO: 29.6 PG (ref 26.5–33)
MCHC RBC AUTO-ENTMCNC: 31.7 G/DL (ref 31.5–36.5)
MCV RBC AUTO: 94 FL (ref 78–100)
MONOCYTES # BLD AUTO: 0.8 10E3/UL (ref 0–1.3)
MONOCYTES NFR BLD AUTO: 11 %
NEUTROPHILS # BLD AUTO: 4.5 10E3/UL (ref 1.6–8.3)
NEUTROPHILS NFR BLD AUTO: 63 %
NRBC # BLD AUTO: 0 10E3/UL
NRBC BLD AUTO-RTO: 0 /100
PLATELET # BLD AUTO: 299 10E3/UL (ref 150–450)
RBC # BLD AUTO: 3.98 10E6/UL (ref 3.8–5.2)
VANCOMYCIN SERPL-MCNC: 16.8 MG/L
WBC # BLD AUTO: 7.1 10E3/UL (ref 4–11)

## 2022-04-07 PROCEDURE — 36415 COLL VENOUS BLD VENIPUNCTURE: CPT | Performed by: INTERNAL MEDICINE

## 2022-04-07 PROCEDURE — 82565 ASSAY OF CREATININE: CPT | Performed by: INTERNAL MEDICINE

## 2022-04-07 PROCEDURE — P9604 ONE-WAY ALLOW PRORATED TRIP: HCPCS | Performed by: INTERNAL MEDICINE

## 2022-04-07 PROCEDURE — 84460 ALANINE AMINO (ALT) (SGPT): CPT | Performed by: INTERNAL MEDICINE

## 2022-04-07 PROCEDURE — 80202 ASSAY OF VANCOMYCIN: CPT | Performed by: INTERNAL MEDICINE

## 2022-04-07 PROCEDURE — 86140 C-REACTIVE PROTEIN: CPT | Performed by: INTERNAL MEDICINE

## 2022-04-07 PROCEDURE — 85014 HEMATOCRIT: CPT | Performed by: INTERNAL MEDICINE

## 2022-04-09 ENCOUNTER — LAB REQUISITION (OUTPATIENT)
Dept: LAB | Facility: CLINIC | Age: 82
End: 2022-04-09
Payer: MEDICARE

## 2022-04-09 DIAGNOSIS — L97.923: ICD-10-CM

## 2022-04-11 LAB
ALT SERPL W P-5'-P-CCNC: 21 U/L (ref 0–45)
BASOPHILS # BLD AUTO: 0.1 10E3/UL (ref 0–0.2)
BASOPHILS NFR BLD AUTO: 1 %
C REACTIVE PROTEIN LHE: 1.9 MG/DL (ref 0–0.8)
CREAT SERPL-MCNC: 0.72 MG/DL (ref 0.6–1.1)
EOSINOPHIL # BLD AUTO: 0.6 10E3/UL (ref 0–0.7)
EOSINOPHIL NFR BLD AUTO: 8 %
ERYTHROCYTE [DISTWIDTH] IN BLOOD BY AUTOMATED COUNT: 14.4 % (ref 10–15)
GFR SERPL CREATININE-BSD FRML MDRD: 83 ML/MIN/1.73M2
HCT VFR BLD AUTO: 37.6 % (ref 35–47)
HGB BLD-MCNC: 12.3 G/DL (ref 11.7–15.7)
IMM GRANULOCYTES # BLD: 0 10E3/UL
IMM GRANULOCYTES NFR BLD: 0 %
LYMPHOCYTES # BLD AUTO: 1.6 10E3/UL (ref 0.8–5.3)
LYMPHOCYTES NFR BLD AUTO: 22 %
MCH RBC QN AUTO: 30.1 PG (ref 26.5–33)
MCHC RBC AUTO-ENTMCNC: 32.7 G/DL (ref 31.5–36.5)
MCV RBC AUTO: 92 FL (ref 78–100)
MONOCYTES # BLD AUTO: 0.9 10E3/UL (ref 0–1.3)
MONOCYTES NFR BLD AUTO: 12 %
NEUTROPHILS # BLD AUTO: 4.3 10E3/UL (ref 1.6–8.3)
NEUTROPHILS NFR BLD AUTO: 57 %
NRBC # BLD AUTO: 0 10E3/UL
NRBC BLD AUTO-RTO: 0 /100
PLATELET # BLD AUTO: 366 10E3/UL (ref 150–450)
RBC # BLD AUTO: 4.08 10E6/UL (ref 3.8–5.2)
VANCOMYCIN SERPL-MCNC: 17.9 MG/L
WBC # BLD AUTO: 7.5 10E3/UL (ref 4–11)

## 2022-04-11 PROCEDURE — P9604 ONE-WAY ALLOW PRORATED TRIP: HCPCS | Performed by: INTERNAL MEDICINE

## 2022-04-11 PROCEDURE — 82565 ASSAY OF CREATININE: CPT | Performed by: INTERNAL MEDICINE

## 2022-04-11 PROCEDURE — 84460 ALANINE AMINO (ALT) (SGPT): CPT | Performed by: INTERNAL MEDICINE

## 2022-04-11 PROCEDURE — 85025 COMPLETE CBC W/AUTO DIFF WBC: CPT | Performed by: INTERNAL MEDICINE

## 2022-04-11 PROCEDURE — 86140 C-REACTIVE PROTEIN: CPT | Performed by: INTERNAL MEDICINE

## 2022-04-11 PROCEDURE — 80202 ASSAY OF VANCOMYCIN: CPT | Performed by: INTERNAL MEDICINE

## 2022-04-11 PROCEDURE — 36415 COLL VENOUS BLD VENIPUNCTURE: CPT | Performed by: INTERNAL MEDICINE

## 2022-04-13 ENCOUNTER — LAB REQUISITION (OUTPATIENT)
Dept: LAB | Facility: CLINIC | Age: 82
End: 2022-04-13
Payer: MEDICARE

## 2022-04-13 DIAGNOSIS — L97.923: ICD-10-CM

## 2022-04-14 LAB
ALT SERPL W P-5'-P-CCNC: 20 U/L (ref 0–45)
BASOPHILS # BLD AUTO: 0.1 10E3/UL (ref 0–0.2)
BASOPHILS NFR BLD AUTO: 1 %
C REACTIVE PROTEIN LHE: 1.2 MG/DL (ref 0–0.8)
CREAT SERPL-MCNC: 0.73 MG/DL (ref 0.6–1.1)
EOSINOPHIL # BLD AUTO: 0.6 10E3/UL (ref 0–0.7)
EOSINOPHIL NFR BLD AUTO: 9 %
ERYTHROCYTE [DISTWIDTH] IN BLOOD BY AUTOMATED COUNT: 14.4 % (ref 10–15)
GFR SERPL CREATININE-BSD FRML MDRD: 81 ML/MIN/1.73M2
HCT VFR BLD AUTO: 37.6 % (ref 35–47)
HGB BLD-MCNC: 11.9 G/DL (ref 11.7–15.7)
IMM GRANULOCYTES # BLD: 0 10E3/UL
IMM GRANULOCYTES NFR BLD: 0 %
LYMPHOCYTES # BLD AUTO: 1.3 10E3/UL (ref 0.8–5.3)
LYMPHOCYTES NFR BLD AUTO: 18 %
MCH RBC QN AUTO: 29.2 PG (ref 26.5–33)
MCHC RBC AUTO-ENTMCNC: 31.6 G/DL (ref 31.5–36.5)
MCV RBC AUTO: 92 FL (ref 78–100)
MONOCYTES # BLD AUTO: 0.8 10E3/UL (ref 0–1.3)
MONOCYTES NFR BLD AUTO: 11 %
NEUTROPHILS # BLD AUTO: 4.4 10E3/UL (ref 1.6–8.3)
NEUTROPHILS NFR BLD AUTO: 61 %
NRBC # BLD AUTO: 0 10E3/UL
NRBC BLD AUTO-RTO: 0 /100
PLATELET # BLD AUTO: 358 10E3/UL (ref 150–450)
RBC # BLD AUTO: 4.08 10E6/UL (ref 3.8–5.2)
VANCOMYCIN SERPL-MCNC: 17.3 MG/L
WBC # BLD AUTO: 7.2 10E3/UL (ref 4–11)

## 2022-04-14 PROCEDURE — 82565 ASSAY OF CREATININE: CPT | Performed by: INTERNAL MEDICINE

## 2022-04-14 PROCEDURE — 84460 ALANINE AMINO (ALT) (SGPT): CPT | Performed by: INTERNAL MEDICINE

## 2022-04-14 PROCEDURE — 85014 HEMATOCRIT: CPT | Performed by: INTERNAL MEDICINE

## 2022-04-14 PROCEDURE — P9604 ONE-WAY ALLOW PRORATED TRIP: HCPCS | Performed by: INTERNAL MEDICINE

## 2022-04-14 PROCEDURE — 36415 COLL VENOUS BLD VENIPUNCTURE: CPT | Performed by: INTERNAL MEDICINE

## 2022-04-14 PROCEDURE — 86140 C-REACTIVE PROTEIN: CPT | Performed by: INTERNAL MEDICINE

## 2022-04-14 PROCEDURE — 80202 ASSAY OF VANCOMYCIN: CPT | Performed by: INTERNAL MEDICINE

## 2022-04-20 PROCEDURE — U0003 INFECTIOUS AGENT DETECTION BY NUCLEIC ACID (DNA OR RNA); SEVERE ACUTE RESPIRATORY SYNDROME CORONAVIRUS 2 (SARS-COV-2) (CORONAVIRUS DISEASE [COVID-19]), AMPLIFIED PROBE TECHNIQUE, MAKING USE OF HIGH THROUGHPUT TECHNOLOGIES AS DESCRIBED BY CMS-2020-01-R: HCPCS | Performed by: INTERNAL MEDICINE

## 2022-04-22 ENCOUNTER — LAB REQUISITION (OUTPATIENT)
Dept: LAB | Facility: CLINIC | Age: 82
End: 2022-04-22
Payer: MEDICARE

## 2022-04-22 DIAGNOSIS — U07.1 COVID-19: ICD-10-CM

## 2022-04-23 LAB — SARS-COV-2 RNA RESP QL NAA+PROBE: NEGATIVE

## 2022-05-25 PROCEDURE — U0005 INFEC AGEN DETEC AMPLI PROBE: HCPCS | Mod: ORL | Performed by: FAMILY MEDICINE

## 2022-05-26 ENCOUNTER — LAB REQUISITION (OUTPATIENT)
Dept: LAB | Facility: CLINIC | Age: 82
End: 2022-05-26
Payer: MEDICARE

## 2022-05-26 DIAGNOSIS — U07.1 COVID-19: ICD-10-CM

## 2022-05-26 LAB — SARS-COV-2 RNA RESP QL NAA+PROBE: NEGATIVE

## 2022-05-27 ENCOUNTER — HOSPITAL ENCOUNTER (EMERGENCY)
Facility: CLINIC | Age: 82
Discharge: HOME | End: 2022-05-27
Attending: EMERGENCY MEDICINE | Admitting: EMERGENCY MEDICINE
Payer: MEDICARE

## 2022-05-27 VITALS
OXYGEN SATURATION: 97 % | DIASTOLIC BLOOD PRESSURE: 66 MMHG | SYSTOLIC BLOOD PRESSURE: 124 MMHG | TEMPERATURE: 98.5 F | HEART RATE: 86 BPM | RESPIRATION RATE: 16 BRPM

## 2022-05-27 DIAGNOSIS — T14.8XXA WOUND INFECTION: ICD-10-CM

## 2022-05-27 DIAGNOSIS — L08.9 WOUND INFECTION: ICD-10-CM

## 2022-05-27 LAB
C REACTIVE PROTEIN LHE: 3.5 MG/DL (ref 0–0.8)
ERYTHROCYTE [DISTWIDTH] IN BLOOD BY AUTOMATED COUNT: 15 % (ref 10–15)
ERYTHROCYTE [SEDIMENTATION RATE] IN BLOOD BY WESTERGREN METHOD: 32 MM/HR (ref 0–20)
HCT VFR BLD AUTO: 34.3 % (ref 35–47)
HGB BLD-MCNC: 11 G/DL (ref 11.7–15.7)
MCH RBC QN AUTO: 28.4 PG (ref 26.5–33)
MCHC RBC AUTO-ENTMCNC: 32.1 G/DL (ref 31.5–36.5)
MCV RBC AUTO: 89 FL (ref 78–100)
PLATELET # BLD AUTO: 371 10E3/UL (ref 150–450)
RBC # BLD AUTO: 3.87 10E6/UL (ref 3.8–5.2)
WBC # BLD AUTO: 8.6 10E3/UL (ref 4–11)

## 2022-05-27 PROCEDURE — 86140 C-REACTIVE PROTEIN: CPT | Performed by: EMERGENCY MEDICINE

## 2022-05-27 PROCEDURE — 36415 COLL VENOUS BLD VENIPUNCTURE: CPT | Performed by: EMERGENCY MEDICINE

## 2022-05-27 PROCEDURE — 85652 RBC SED RATE AUTOMATED: CPT | Performed by: EMERGENCY MEDICINE

## 2022-05-27 PROCEDURE — 85018 HEMOGLOBIN: CPT | Performed by: EMERGENCY MEDICINE

## 2022-05-27 PROCEDURE — 250N000013 HC RX MED GY IP 250 OP 250 PS 637: Performed by: EMERGENCY MEDICINE

## 2022-05-27 PROCEDURE — 99283 EMERGENCY DEPT VISIT LOW MDM: CPT

## 2022-05-27 RX ORDER — CLINDAMYCIN HCL 300 MG
300 CAPSULE ORAL 4 TIMES DAILY
Qty: 40 CAPSULE | Refills: 0 | Status: SHIPPED | OUTPATIENT
Start: 2022-05-27 | End: 2022-01-01

## 2022-05-27 RX ORDER — CLINDAMYCIN HCL 150 MG
300 CAPSULE ORAL ONCE
Status: COMPLETED | OUTPATIENT
Start: 2022-05-27 | End: 2022-05-27

## 2022-05-27 RX ADMIN — CLINDAMYCIN HYDROCHLORIDE 300 MG: 150 CAPSULE ORAL at 23:23

## 2022-05-28 NOTE — ED TRIAGE NOTES
Pt presents via [facility] from [facility] for evaluation of a wound on the left lower extremity. Pt has a wound vac in place and is scheduled for surgery on 6/09/2022. Facility sent her to [facility] for eval of the wound. They think that it may be infected.   Pt is aeffebrile per EMS, VSS.      Triage Assessment     Row Name 05/27/22 2129       Triage Assessment (Adult)    Airway WDL WDL       Respiratory WDL    Respiratory WDL WDL       Skin Circulation/Temperature WDL    Skin Circulation/Temperature WDL X;circulation    Skin Circulation --  Stasis ulcer with wound cvac in place       Cardiac WDL    Cardiac WDL WDL       Peripheral/Neurovascular WDL    Peripheral Neurovascular WDL neurovascular assessment lower;capillary refill    Capillary Refill, General greater than 3 secs       LLE Neurovascular Assessment    Temperature LLE cool    Color LLE mottled    Sensation LLE no tenderness

## 2022-05-28 NOTE — ED PROVIDER NOTES
EMERGENCY DEPARTMENT ENCOUNTER      NAME: Laverne Christensen  AGE: 81 year old female  YOB: 1940  MRN: [id number]  EVALUATION DATE & TIME: 5/27/2022  9:22 PM    PCP: Joel Jimenez    ED PROVIDER: Raf Leigh M.D.      Chief Complaint   Patient presents with     Wound Check     Left leg         FINAL IMPRESSION:  Wound infection      ED COURSE & MEDICAL DECISION MAKING:    Pertinent Labs & Imaging studies reviewed. (See chart for details)  81 year old female presents to the Emergency Department for evaluation of wound to the left leg.  Patient initially had an injury which turned into an ulceration.  Patient had a wound VAC on it for a number of days.  Care facility tonight thought there might be evidence of infection as there is increased redness.  Patient reports no change in symptoms.  Reports only minimal discomfort.  Examination reveals a wound VAC to the distal lateral lower left extremity.  Proximately 1.5 cm surrounding erythema which is nontender and soft.  No induration to suggest infection.  Patient is afebrile.  We will obtain inflammatory/infectious markers prior to making decision to remove wound VAC.  Patient is scheduled for skin graft in the next few weeks.  Patient appears non toxic with stable vitals signs. Overall exam is benign.    9:25 PM I met with the patient for the initial interview and physical examination. Discussed plan for treatment and workup in the ED.    10:45 PM.  Inflammatory markers slightly elevated.  White cell count normal.  We will start patient on clindamycin out of caution.  At the conclusion of the encounter I discussed the results of all of the tests and the disposition. The questions were answered and return precautions provided. The patient or family acknowledged understanding and was agreeable with the care plan.       PPE: Provider wore gloves, N95 mask, eye protection, surgical cap.     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    Patient presents from [facility] with a left leg wound that has concern of infection. Patient reports she fell a couple of weeks ago. She has a wound vac that started leaking today, she notified a nurse who said she is concerned the wound might be infected. Patient afebrile. She endorses she is set for a skin graft in a few weeks. Patient denies any other current complaints.      REVIEW OF SYSTEMS   Constitutional:  Denies fever, chills  Respiratory:  Denies productive cough or increased work of breathing  Cardiovascular:  Denies chest pain, palpitations  GI:  Denies abdominal pain, nausea, vomiting, or change in bowel or bladder habits   Musculoskeletal:  Denies any new muscle/joint swelling  Skin:  Denies rash. Positive for wound to left lower leg.  Neurologic:  Denies focal weakness  All systems negative except as marked.     PAST MEDICAL HISTORY:  Past Medical History:   Diagnosis Date     Arthritis     DJD     COPD (chronic obstructive pulmonary disease) (H)      Osteopenia      Thyroid disease     HYPOTHYROIDISM       PAST SURGICAL HISTORY:  Past Surgical History:   Procedure Laterality Date     ABDOMEN SURGERY      SM INTESTINE SURGERY     APPENDECTOMY       BREAST SURGERY      BREAST LUMPECTOMY     ENT SURGERY      TONSILLECTOMY     HEAD & NECK SURGERY      THYROIDECTOMY     PHACOEMULSIFICATION CLEAR CORNEA WITH STANDARD INTRAOCULAR LENS IMPLANT Left 3/16/2016    Procedure: PHACOEMULSIFICATION CLEAR CORNEA  WITH STANDARD INTRAOCULAR LENS IMPLANT;  Surgeon: Darius Finch MD;  Location: [facility]     PHACOEMULSIFICATION CLEAR CORNEA WITH STANDARD INTRAOCULAR LENS IMPLANT Right 3/30/2016    Procedure: PHACOEMULSIFICATION CLEAR CORNEA WITH STANDARD INTRAOCULAR LENS IMPLANT;  Surgeon: Darius Finch MD;  Location: [facility]     THORACIC SURGERY      LUNG NODULE REMOVAL     WEDGE RESECTION EYELID Left 12/4/2015    Procedure: WEDGE RESECTION EYELID;  Surgeon: Jone Acharya MD;  Location: [facility]         CURRENT MEDICATIONS:    No current facility-administered medications for this encounter.    Current Outpatient Medications:      Alendronate Sodium (FOSAMAX PO), Take 70 mg by mouth once a week, Disp: , Rfl:      ASPIRIN PO, Take by mouth daily, Disp: , Rfl:      calcium carb 1250 mg, 500 mg [place],/vitamin D 200 units (OSCAL WITH D) 500-200 MG-UNIT per tablet, Take 1 tablet by mouth 2 times daily (with meals), Disp: , Rfl:      Coenzyme Q10 (COQ10 PO), Take  by mouth daily., Disp: , Rfl:      LEVOTHYROXINE SODIUM 75 MCG PO TABS, 1 TABLET DAILY, Disp: , Rfl:      MULTIPLE VITAMIN PO, None Entered, Disp: , Rfl:      NEW MED, daily Eye vitamin pill, Disp: , Rfl:      VITAMIN C PO, None Entered, Disp: , Rfl:      ZINC PO, None Entered, Disp: , Rfl:     ALLERGIES:  No Known Allergies    FAMILY HISTORY:  History reviewed. No pertinent family history.    SOCIAL HISTORY:   Social History     Socioeconomic History     Marital status:      Spouse name: None     Number of children: None     Years of education: None     Highest education level: None   Tobacco Use     Smoking status: Current Every Day Smoker     Packs/day: 0.50     Types: Cigarettes     Smokeless tobacco: Current User   Substance and Sexual Activity     Alcohol use: Yes     Comment: daily cocktail     Drug use: No       VITALS:  No data found.     PHYSICAL EXAM    Constitutional:  Awake, alert, in no apparent distress  HENT:  Normocephalic, Atraumatic. Bilateral  external ears normal. Oropharynx moist. Nose normal. Neck- Normal range of motion with no guarding, No midline cervical tenderness, Supple, No stridor.   Eyes:  PERRL, EOMI with no signs of entrapment, Conjunctiva normal, No discharge.   Respiratory:  Normal breath sounds, No respiratory distress, No wheezing.    Cardiovascular:  Normal heart rate, Normal rhythm, No appreciable rubs or gallops.   GI:  Soft, No tenderness, No distension, No palpable masses  Musculoskeletal: No edema. Good range of motion in all major joints. No tenderness to palpation or major deformities noted.  Integument:  Warm, Dry, No rash. Wound vac distal lateral left tibular area with surrounding erythema, non-tender and soft. 1.5 cm.  Neurologic:  Alert & oriented, Normal motor function, Normal sensory function, No focal deficits noted.   Psychiatric:  Affect normal, Judgment normal, Mood normal.     LAB:  All pertinent labs reviewed and interpreted.  Results for orders placed or performed during the hospital encounter of 05/27/22   CBC (+ platelets, no diff)     Status: Abnormal   Result Value Ref Range    WBC Count 8.6 4.0 - 11.0 10e3/uL    RBC Count 3.87 3.80 - 5.20 10e6/uL    Hemoglobin 11.0 (L) 11.7 - 15.7 g/dL    Hematocrit 34.3 (L) 35.0 - 47.0 %    MCV 89 78 - 100 fL    MCH 28.4 26.5 - 33.0 pg    MCHC 32.1 31.5 - 36.5 g/dL    RDW 15.0 10.0 - 15.0 %    Platelet Count 371 150 - 450 10e3/uL   CRP inflammation     Status: Abnormal   Result Value Ref Range    CRP 3.5 (H) 0.0 - 0.8 mg/dL   Erythrocyte sedimentation rate auto     Status: Abnormal   Result Value Ref Range    Erythrocyte Sedimentation Rate 32 (H) 0 - 20 mm/hr           I, America Gutierrez, am serving as a scribe to document services personally performed by Raf Leigh MD, based on my observation and the provider's statements to me. I, Raf Leigh MD attest that America Gutierrez is acting in a scribe capacity, has observed my performance of the services and has documented them in  accordance with my direction.    Raf Leigh M.D.  Emergency Medicine  [facility] EMERGENCY ROOM     Raf Leigh MD  05/27/22 [id number]